# Patient Record
Sex: MALE | Race: WHITE | NOT HISPANIC OR LATINO | Employment: FULL TIME | URBAN - METROPOLITAN AREA
[De-identification: names, ages, dates, MRNs, and addresses within clinical notes are randomized per-mention and may not be internally consistent; named-entity substitution may affect disease eponyms.]

---

## 2020-01-15 ENCOUNTER — OFFICE VISIT (OUTPATIENT)
Dept: FAMILY MEDICINE CLINIC | Facility: CLINIC | Age: 55
End: 2020-01-15
Payer: COMMERCIAL

## 2020-01-15 VITALS
HEART RATE: 94 BPM | HEIGHT: 73 IN | SYSTOLIC BLOOD PRESSURE: 138 MMHG | BODY MASS INDEX: 34.67 KG/M2 | OXYGEN SATURATION: 95 % | RESPIRATION RATE: 16 BRPM | DIASTOLIC BLOOD PRESSURE: 72 MMHG | TEMPERATURE: 97.4 F | WEIGHT: 261.6 LBS

## 2020-01-15 DIAGNOSIS — M77.11 LATERAL EPICONDYLITIS OF RIGHT ELBOW: Primary | ICD-10-CM

## 2020-01-15 PROCEDURE — 99213 OFFICE O/P EST LOW 20 MIN: CPT | Performed by: NURSE PRACTITIONER

## 2020-01-15 RX ORDER — SODIUM FLUORIDE 1.1 G/100G
GEL, DENTIFRICE ORAL
COMMUNITY
Start: 2019-10-12 | End: 2020-02-11 | Stop reason: ALTCHOICE

## 2020-01-15 RX ORDER — MELOXICAM 15 MG/1
15 TABLET ORAL DAILY
Qty: 30 TABLET | Refills: 0 | Status: SHIPPED | OUTPATIENT
Start: 2020-01-15 | End: 2020-02-11 | Stop reason: ALTCHOICE

## 2020-01-15 NOTE — PROGRESS NOTES
Assessment/Plan:    Will treat with Meloxicam daily  Reviewed stretching to be done 2-3 times per day  He will RTO in 1 month for follow up  Recommended he schedule a CPE  States he has DOT physicals and does not wish to schedule at this time  He did bring previous PCP records  1  Lateral epicondylitis of right elbow  -     meloxicam (MOBIC) 15 mg tablet; Take 1 tablet (15 mg total) by mouth daily      BMI Counseling: Body mass index is 34 51 kg/m²  The BMI is above normal  Nutrition recommendations include consuming healthier snacks  Exercise recommendations include moderate physical activity 150 minutes/week  Patient Instructions     Tennis Elbow Exercises   AMBULATORY CARE:   Tennis elbow exercises  help decrease pain in your elbow, forearm, wrist, and hand  They also help strengthen your arm muscles and prevent further injury  Start these exercises slowly  Do the exercises on both arms  Stop if you feel pain  · Finger extensions:  Hold your fingers close together  Put a rubber band around the outside of your fingers and thumb  Spread your fingers apart and then slowly bring them together without letting the rubber band fall off  Repeat 40 times  · Wrist flexor stretch:  Hold your arm straight out in front of you with your palm facing down  Use your other hand to grasp your fingers  Keep your elbow straight and slowly bend your hand back  Your fingertips should point up and your palm should face away from you  Do this until you feel a stretch in the top of your wrist  Hold for 10 seconds  Repeat 5 times  · Wrist extensor stretch: This stretch is the opposite of the wrist flexor stretch  Hold your arm straight out in front of you with your palm facing down  Use your other hand to grasp your fingers  Keep your elbow straight and slowly bend your hand down  Your fingertips should point down and your palm should face you   Do this until you feel a stretch in your wrist  Hold for 10 seconds  Repeat 5 times  · Bicep curls:  Place your hand under your injured elbow for support  Turn your palm so that it faces up and hold a weight in your hand  Ask your healthcare provider how much weight you should use  Slowly bend and straighten your elbow 30 times  · :  Hold a soft rubber ball or tennis ball in your hand  Squeeze the ball as hard as you can and hold this position  Ask your healthcare provider how long to hold this position  Repeat this exercise as directed by your healthcare provider  Contact your healthcare provider if:   · You have increased pain or weakness in your arm, wrist, hand, or fingers  · You have new numbness or tingling in your arm, hand, or fingers  · You have questions or concerns about tennis elbow exercises  © 2017 [address] Information is for End User's use only and may not be sold, redistributed or otherwise used for commercial purposes  All illustrations and images included in CareNotes® are the copyrighted property of A D A M [employer] Inc  or Malcolm Queen  The above information is an  only  It is not intended as medical advice for individual conditions or treatments  Talk to your doctor, nurse or pharmacist before following any medical regimen to see if it is safe and effective for you  Return in about 1 month (around 2/15/2020)  Subjective:      Patient ID: Barbee Cowden is a 53 y o  male  Chief Complaint   Patient presents with    Arm Pain     right arm up to his bicept, feels like he cant control it of lift it  vfrma    Establish Care     np       Here today with complaints of right arm pain for the past 2 weeks  He always sleeps with his arm behind his head and does a lot of repetitive pulling and bending movements  States he woke up with the pain and he developed pain in the lateral aspect of the elbow  Pain described as stabbing    He was unable to actively lift his arm without assistance  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  Was unable to hold a handheld device in his arm yesterday  He took Advil which does help significantly  No obvious swelling or redness  The following portions of the patient's history were reviewed and updated as appropriate: allergies, current medications, past family history, past medical history, past social history, past surgical history and problem list     Review of Systems   Constitutional: Negative  Musculoskeletal:        See HPI         Current Outpatient Medications   Medication Sig Dispense Refill    DENTA 5000 PLUS 1 1 % CREA       meloxicam (MOBIC) 15 mg tablet Take 1 tablet (15 mg total) by mouth daily 30 tablet 0     No current facility-administered medications for this visit  Objective:    /72   Pulse 94   Temp (!) 97 4 °F (36 3 °C)   Resp 16   Ht 6' 1" (1 854 m)   Wt 119 kg (261 lb 9 6 oz)   SpO2 95%   BMI 34 51 kg/m²        Physical Exam   Constitutional: He appears well-developed and well-nourished  Cardiovascular: Normal rate, regular rhythm, normal heart sounds and intact distal pulses  No murmur heard  Pulmonary/Chest: Effort normal and breath sounds normal    Musculoskeletal:        Right elbow: He exhibits normal range of motion and no swelling  Tenderness found  Lateral epicondyle tenderness noted  Neurological: He is alert  Skin: Skin is warm and dry  Psychiatric: He has a normal mood and affect  Nursing note and vitals reviewed               Jonel Gardner

## 2020-01-15 NOTE — PATIENT INSTRUCTIONS
Tennis Elbow Exercises   AMBULATORY CARE:   Tennis elbow exercises  help decrease pain in your elbow, forearm, wrist, and hand  They also help strengthen your arm muscles and prevent further injury  Start these exercises slowly  Do the exercises on both arms  Stop if you feel pain  · Finger extensions:  Hold your fingers close together  Put a rubber band around the outside of your fingers and thumb  Spread your fingers apart and then slowly bring them together without letting the rubber band fall off  Repeat 40 times  · Wrist flexor stretch:  Hold your arm straight out in front of you with your palm facing down  Use your other hand to grasp your fingers  Keep your elbow straight and slowly bend your hand back  Your fingertips should point up and your palm should face away from you  Do this until you feel a stretch in the top of your wrist  Hold for 10 seconds  Repeat 5 times  · Wrist extensor stretch: This stretch is the opposite of the wrist flexor stretch  Hold your arm straight out in front of you with your palm facing down  Use your other hand to grasp your fingers  Keep your elbow straight and slowly bend your hand down  Your fingertips should point down and your palm should face you  Do this until you feel a stretch in your wrist  Hold for 10 seconds  Repeat 5 times  · Bicep curls:  Place your hand under your injured elbow for support  Turn your palm so that it faces up and hold a weight in your hand  Ask your healthcare provider how much weight you should use  Slowly bend and straighten your elbow 30 times  · :  Hold a soft rubber ball or tennis ball in your hand  Squeeze the ball as hard as you can and hold this position  Ask your healthcare provider how long to hold this position  Repeat this exercise as directed by your healthcare provider    Contact your healthcare provider if:   · You have increased pain or weakness in your arm, wrist, hand, or fingers  · You have new numbness or tingling in your arm, hand, or fingers  · You have questions or concerns about tennis elbow exercises  © 2017 [address] Information is for End User's use only and may not be sold, redistributed or otherwise used for commercial purposes  All illustrations and images included in CareNotes® are the copyrighted property of A D A M , Inc  or Malcolm Queen  The above information is an  only  It is not intended as medical advice for individual conditions or treatments  Talk to your doctor, nurse or pharmacist before following any medical regimen to see if it is safe and effective for you

## 2020-02-04 ENCOUNTER — TELEPHONE (OUTPATIENT)
Dept: FAMILY MEDICINE CLINIC | Facility: CLINIC | Age: 55
End: 2020-02-04

## 2020-02-04 DIAGNOSIS — R73.09 ABNORMAL GLUCOSE: Primary | ICD-10-CM

## 2020-02-04 NOTE — TELEPHONE ENCOUNTER
Reviewed pt's previous records from Dr Nate Delgado office and see that he has diabetes  He did not mention this at his visit with me last month  He normally has DOT physicals and did not want to come back here for CPE  Can someone please call him and ask if he is seeing someone for his diabetes, and if not ask him to schedule a f/u for this  I can order labs for him to do prior to visit   Bimal Sanches

## 2020-02-05 NOTE — TELEPHONE ENCOUNTER
Spoke to pt says at one point a UA showed he has a splash of sugar in his urine and Dr Canelo Bethea said he had diabetes, but then in his following DOT it came back normal  But he does not have diabetes      Dinora Martin MA

## 2020-02-05 NOTE — TELEPHONE ENCOUNTER
I found an A1c level of 7 7 in his chart, which would be consistent with uncontrolled diabetes  This is a blood test, not a urine  I strongly suggest he have some more blood work done to follow up on this    Mormandi Speed

## 2020-02-05 NOTE — TELEPHONE ENCOUNTER
Spoke to Madi Pichardo, says he doesn't understand why Dr Soy Dumont would not bring it up to him  He states he doesn't know if he's picking us as his PCP  I explained to him we will order his blood work and leave up in the front for her can pick it up when he's ready       Vinicio Warren, MA

## 2020-02-11 ENCOUNTER — OFFICE VISIT (OUTPATIENT)
Dept: FAMILY MEDICINE CLINIC | Facility: CLINIC | Age: 55
End: 2020-02-11
Payer: COMMERCIAL

## 2020-02-11 VITALS
OXYGEN SATURATION: 95 % | HEART RATE: 86 BPM | RESPIRATION RATE: 16 BRPM | TEMPERATURE: 98.2 F | WEIGHT: 261 LBS | BODY MASS INDEX: 34.59 KG/M2 | DIASTOLIC BLOOD PRESSURE: 80 MMHG | SYSTOLIC BLOOD PRESSURE: 130 MMHG | HEIGHT: 73 IN

## 2020-02-11 DIAGNOSIS — M77.11 LATERAL EPICONDYLITIS OF RIGHT ELBOW: Primary | ICD-10-CM

## 2020-02-11 PROCEDURE — 99213 OFFICE O/P EST LOW 20 MIN: CPT | Performed by: FAMILY MEDICINE

## 2020-02-11 PROCEDURE — 1036F TOBACCO NON-USER: CPT | Performed by: FAMILY MEDICINE

## 2020-02-11 PROCEDURE — 3008F BODY MASS INDEX DOCD: CPT | Performed by: FAMILY MEDICINE

## 2020-02-11 NOTE — PROGRESS NOTES
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 - pt was advised to use an elbow brace for the next few months while at work to protect and support his elbow    There are no Patient Instructions on file for this visit  No follow-ups on file  Subjective:      Patient ID: Osmar Tejada is a 53 y o  male  Chief Complaint   Patient presents with    Follow-up     arm pain-wmcma       Pt unknown to me here to follow up arm pain  Was seen by a different provider a few weeks ago  Pt states when he came in he states he was told he had a pinchjed nerve that was giving him pain states he took a muscle relaxer and now he is pain free and he feels better  Pt states he is using the arm completely normally      The following portions of the patient's history were reviewed and updated as appropriate: allergies, current medications, past family history, past medical history, past social history, past surgical history and problem list     Review of Systems   Constitutional: Negative for activity change, appetite change, chills, diaphoresis, fatigue, fever and unexpected weight change  HENT: Negative for congestion, dental problem, ear pain, mouth sores, sinus pressure, sinus pain, sore throat and trouble swallowing  Eyes: Negative for photophobia, discharge and itching  Respiratory: Negative for apnea, chest tightness and shortness of breath  Cardiovascular: Negative for chest pain, palpitations and leg swelling  Gastrointestinal: Negative for abdominal distention, abdominal pain, blood in stool, nausea and vomiting  Endocrine: Negative for cold intolerance, heat intolerance, polydipsia, polyphagia and polyuria  Genitourinary: Negative for difficulty urinating  Musculoskeletal: Negative for arthralgias  Skin: Negative for color change and wound     Neurological: Negative for dizziness, syncope, speech difficulty and headaches  Hematological: Negative for adenopathy  Psychiatric/Behavioral: Negative for agitation and behavioral problems  No current outpatient medications on file  No current facility-administered medications for this visit  Objective:    /80   Pulse 86   Temp 98 2 °F (36 8 °C)   Resp 16   Ht 6' 1" (1 854 m)   Wt 118 kg (261 lb)   SpO2 95%   BMI 34 43 kg/m²        Physical Exam   Constitutional: He appears well-developed and well-nourished  No distress  HENT:   Head: Normocephalic and atraumatic  Right Ear: External ear normal    Left Ear: External ear normal    Nose: Nose normal    Mouth/Throat: Oropharynx is clear and moist  No oropharyngeal exudate  Eyes: Pupils are equal, round, and reactive to light  EOM are normal  Right eye exhibits no discharge  Left eye exhibits no discharge  No scleral icterus  Neck: No thyromegaly present  Cardiovascular: Normal rate and normal heart sounds  No murmur heard  Pulmonary/Chest: Effort normal and breath sounds normal  No respiratory distress  He has no wheezes  Abdominal: Soft  Bowel sounds are normal  He exhibits no distension and no mass  There is no tenderness  There is no rebound and no guarding  Musculoskeletal: Normal range of motion  No pain with ROM testing  No tender at lat condyle   Neurological: He is alert  He displays normal reflexes  Coordination normal    Skin: Skin is warm and dry  No rash noted  He is not diaphoretic  No erythema  Psychiatric: He has a normal mood and affect  His behavior is normal    Nursing note and vitals reviewed               Bebe Urias DO

## 2021-01-10 ENCOUNTER — OFFICE VISIT (OUTPATIENT)
Dept: URGENT CARE | Facility: CLINIC | Age: 56
End: 2021-01-10
Payer: COMMERCIAL

## 2021-01-10 VITALS
RESPIRATION RATE: 18 BRPM | BODY MASS INDEX: 35.21 KG/M2 | DIASTOLIC BLOOD PRESSURE: 80 MMHG | OXYGEN SATURATION: 95 % | SYSTOLIC BLOOD PRESSURE: 143 MMHG | TEMPERATURE: 97.6 F | WEIGHT: 260 LBS | HEART RATE: 102 BPM | HEIGHT: 72 IN

## 2021-01-10 DIAGNOSIS — L03.012 CELLULITIS OF LEFT THUMB: Primary | ICD-10-CM

## 2021-01-10 PROCEDURE — 99213 OFFICE O/P EST LOW 20 MIN: CPT | Performed by: PHYSICIAN ASSISTANT

## 2021-01-10 RX ORDER — SULFAMETHOXAZOLE AND TRIMETHOPRIM 800; 160 MG/1; MG/1
1 TABLET ORAL EVERY 12 HOURS SCHEDULED
Qty: 14 TABLET | Refills: 0 | Status: SHIPPED | OUTPATIENT
Start: 2021-01-10 | End: 2021-01-17

## 2021-01-10 NOTE — PROGRESS NOTES
[facility]    NAME: Barbara Russell is a 63 y o  male  : 1965    MRN: [id number]  DATE: January 10, 2021  TIME: 3:19 PM    Assessment and Plan   Cellulitis of left thumb [L03 012]  1  Cellulitis of left thumb  sulfamethoxazole-trimethoprim (BACTRIM DS) 800-160 mg per tablet     Patient Instructions   Cellulitis of L thumb  rx bactrim twice daily x 7 days sent via EMR  Keep clean and dry  If redness spreading up thumb and hand to ER for IV antibiotics    Follow up with PCP in 3-5 days  Proceed to  ER if symptoms worsen  Chief Complaint     Chief Complaint   Patient presents with    Finger Injury     Patient complains of Left thumb pain after cutting a allen about a week ago, States it has spots and dry patches on it and is sensitive to the touch  History of Present Illness       Melecio Nuñez is a 58-year-old male who presents to clinic with redness and skin changes on his left thumb  He states that 1 week ago he was cutting a allen when the knife slipped and cut his finger  He said that he applied Neosporin and kept the wound clean and covered  He states that 2 days ago the redness seem to be worsening and he had these white to yellow pustules around the cuticle of the thumb and the pad of his thumb  He denies any fever, chills, discharge from the wound, or streaking up that hand  Review of Systems   Review of Systems   Constitutional: Negative for chills and fever  Skin: Positive for color change and wound       Current Medications       Current Outpatient Medications:     sulfamethoxazole-trimethoprim (BACTRIM DS) 800-160 mg per tablet, Take 1 tablet by mouth every 12 (twelve) hours for 7 days, Disp: 14 tablet, Rfl: 0    Current Allergies     Allergies as of 01/10/2021 - Reviewed 01/10/2021   Allergen Reaction Noted    Indocin [indomethacin] Vomiting 2016            The following portions of the patient's history were reviewed and updated as appropriate: allergies, current medications, past family history, past medical history, past social history, past surgical history and problem list      Past Medical History:   Diagnosis Date    Sleep apnea        Past Surgical History:   Procedure Laterality Date    PALATE / Kathrynn Rosy / EXCISION      TONSILLECTOMY AND ADENOIDECTOMY      VASECTOMY         History reviewed  No pertinent family history  Medications have been verified  Objective   /80 (BP Location: Left arm, Patient Position: Sitting, Cuff Size: Standard)   Pulse 102   Temp 97 6 °F (36 4 °C) (Tympanic)   Resp 18   Ht 6' (1 829 m)   Wt 118 kg (260 lb)   SpO2 95%   BMI 35 26 kg/m²   No LMP for male patient  Physical Exam     Physical Exam  Vitals signs and nursing note reviewed  Constitutional:       General: He is not in acute distress  Appearance: Normal appearance  He is not ill-appearing  Cardiovascular:      Rate and Rhythm: Normal rate and regular rhythm  Heart sounds: Normal heart sounds  Pulmonary:      Effort: Pulmonary effort is normal       Breath sounds: Normal breath sounds  Musculoskeletal:        Hands:    Neurological:      Mental Status: He is alert and oriented to person, place, and time     Psychiatric:         Mood and Affect: Mood normal          Behavior: Behavior normal

## 2021-01-10 NOTE — PATIENT INSTRUCTIONS
Cellulitis of L thumb  rx bactrim twice daily x 7 days sent via EMR  Keep clean and dry  If redness spreading up thumb and hand to ER for IV antibiotics    Follow up with PCP in 3-5 days  Proceed to  ER if symptoms worsen  Cellulitis   WHAT YOU NEED TO KNOW:   Cellulitis is a skin infection caused by bacteria  Cellulitis may go away on its own or you may need treatment  Your healthcare provider may draw a [place] around the outside edges of your cellulitis  If your cellulitis spreads, your healthcare provider will see it outside of the [place]  DISCHARGE INSTRUCTIONS:   Call 911 if:   · You have sudden trouble breathing or chest pain  Return to the emergency department if:   · Your wound gets larger and more painful  · You feel a crackling under your skin when you touch it  · You have purple dots or bumps on your skin, or you see bleeding under your skin  · You have new swelling and pain in your legs  · The red, warm, swollen area gets larger  · You see red streaks coming from the infected area  Contact your healthcare provider if:   · You have a fever  · Your fever or pain does not go away or gets worse  · The area does not get smaller after 2 days of antibiotics  · Your skin is flaking or peeling off  · You have questions or concerns about your condition or care  Medicines:   · Antibiotics  help treat the bacterial infection  · NSAIDs , such as ibuprofen, help decrease swelling, pain, and fever  NSAIDs can cause stomach bleeding or kidney problems in certain people  If you take blood thinner medicine, always ask if NSAIDs are safe for you  Always read the medicine label and follow directions  Do not give these medicines to children under 10months of age without direction from your child's healthcare provider  · Acetaminophen  decreases pain and fever  It is available without a doctor's order  Ask how much to take and how often to take it  Follow directions  Read the labels of all other medicines you are using to see if they also contain acetaminophen, or ask your doctor or pharmacist  Acetaminophen can cause liver damage if not taken correctly  Do not use more than 4 grams (4,000 milligrams) total of acetaminophen in one day  · Take your medicine as directed  Contact your healthcare provider if you think your medicine is not helping or if you have side effects  Tell him or her if you are allergic to any medicine  Keep a list of the medicines, vitamins, and herbs you take  Include the amounts, and when and why you take them  Bring the list or the pill bottles to follow-up visits  Carry your medicine list with you in case of an emergency  Self-care:   · Elevate the area above the level of your heart  as often as you can  This will help decrease swelling and pain  Prop the area on pillows or blankets to keep it elevated comfortably  · Clean the area daily until the wound scabs over  Gently wash the area with soap and water  Pat dry  Use dressings as directed  · Place cool or warm, wet cloths on the area as directed  Use clean cloths and clean water  Leave it on the area until the cloth is room temperature  Pat the area dry with a clean, dry cloth  The cloths may help decrease pain  Prevent cellulitis:   · Do not scratch bug bites or areas of injury  You increase your risk for cellulitis by scratching these areas  · Do not share personal items, such as towels, clothing, and razors  · Clean exercise equipment  with germ-killing  before and after you use it  · Wash your hands often  Use soap and water  Wash your hands after you use the bathroom, change a child's diapers, or sneeze  Wash your hands before you prepare or eat food  Use lotion to prevent dry, cracked skin  · Wear pressure stockings as directed  You may be told to wear the stockings if you have peripheral edema   The stockings improve blood flow and decrease swelling  · Treat athlete's foot  This can help prevent the spread of a bacterial skin infection  Follow up with your healthcare provider within 3 days, or as directed: Your healthcare provider will check if your cellulitis is getting better  You may need different medicine  Write down your questions so you remember to ask them during your visits  © Copyright [address] Information is for End User's use only and may not be sold, redistributed or otherwise used for commercial purposes  All illustrations and images included in CareNotes® are the copyrighted property of [employer]  or [facility] [employer]per [employer]   The above information is an  only  It is not intended as medical advice for individual conditions or treatments  Talk to your doctor, nurse or pharmacist before following any medical regimen to see if it is safe and effective for you

## 2021-04-08 DIAGNOSIS — Z23 ENCOUNTER FOR IMMUNIZATION: ICD-10-CM

## 2021-04-11 ENCOUNTER — APPOINTMENT (EMERGENCY)
Dept: RADIOLOGY | Facility: HOSPITAL | Age: 56
End: 2021-04-11
Payer: COMMERCIAL

## 2021-04-11 ENCOUNTER — ANESTHESIA EVENT (OUTPATIENT)
Dept: PERIOP | Facility: HOSPITAL | Age: 56
End: 2021-04-11
Payer: COMMERCIAL

## 2021-04-11 ENCOUNTER — HOSPITAL ENCOUNTER (OUTPATIENT)
Facility: HOSPITAL | Age: 56
Setting detail: OUTPATIENT SURGERY
Discharge: HOME | End: 2021-04-12
Attending: EMERGENCY MEDICINE | Admitting: SURGERY
Payer: COMMERCIAL

## 2021-04-11 ENCOUNTER — ANESTHESIA (OUTPATIENT)
Dept: PERIOP | Facility: HOSPITAL | Age: 56
End: 2021-04-11
Payer: COMMERCIAL

## 2021-04-11 DIAGNOSIS — K40.90 INGUINAL HERNIA: Primary | ICD-10-CM

## 2021-04-11 PROBLEM — Z87.891 FORMER TOBACCO USE: Status: ACTIVE | Noted: 2021-04-11

## 2021-04-11 PROBLEM — E66.9 OBESITY (BMI 30-39.9): Status: ACTIVE | Noted: 2021-04-11

## 2021-04-11 PROBLEM — K40.30 INCARCERATED LEFT INGUINAL HERNIA: Status: ACTIVE | Noted: 2021-04-11

## 2021-04-11 LAB
ALBUMIN SERPL BCP-MCNC: 4.5 G/DL (ref 3.5–5)
ALP SERPL-CCNC: 115 U/L (ref 46–116)
ALT SERPL W P-5'-P-CCNC: 60 U/L (ref 12–78)
ANION GAP SERPL CALCULATED.3IONS-SCNC: 11 MMOL/L (ref 4–13)
AST SERPL W P-5'-P-CCNC: 18 U/L (ref 5–45)
BACTERIA UR QL AUTO: NORMAL /HPF
BASOPHILS # BLD AUTO: 0.04 THOUSANDS/ΜL (ref 0–0.1)
BASOPHILS NFR BLD AUTO: 1 % (ref 0–1)
BILIRUB SERPL-MCNC: 0.47 MG/DL (ref 0.2–1)
BILIRUB UR QL STRIP: NEGATIVE
BUN SERPL-MCNC: 14 MG/DL (ref 5–25)
CALCIUM SERPL-MCNC: 10 MG/DL (ref 8.3–10.1)
CHLORIDE SERPL-SCNC: 100 MMOL/L (ref 100–108)
CLARITY UR: CLEAR
CO2 SERPL-SCNC: 27 MMOL/L (ref 21–32)
COLOR UR: YELLOW
CREAT SERPL-MCNC: 0.75 MG/DL (ref 0.6–1.3)
EOSINOPHIL # BLD AUTO: 0.12 THOUSAND/ΜL (ref 0–0.61)
EOSINOPHIL NFR BLD AUTO: 2 % (ref 0–6)
ERYTHROCYTE [DISTWIDTH] IN BLOOD BY AUTOMATED COUNT: 11.9 % (ref 11.6–15.1)
FLUAV RNA RESP QL NAA+PROBE: NEGATIVE
FLUBV RNA RESP QL NAA+PROBE: NEGATIVE
GFR SERPL CREATININE-BSD FRML MDRD: 102 ML/MIN/1.73SQ M
GLUCOSE SERPL-MCNC: 260 MG/DL (ref 65–140)
GLUCOSE SERPL-MCNC: 286 MG/DL (ref 65–140)
GLUCOSE SERPL-MCNC: 308 MG/DL (ref 65–140)
GLUCOSE UR STRIP-MCNC: ABNORMAL MG/DL
HCT VFR BLD AUTO: 49.2 % (ref 36.5–49.3)
HGB BLD-MCNC: 16.6 G/DL (ref 12–17)
HGB UR QL STRIP.AUTO: NEGATIVE
IMM GRANULOCYTES # BLD AUTO: 0.03 THOUSAND/UL (ref 0–0.2)
IMM GRANULOCYTES NFR BLD AUTO: 0 % (ref 0–2)
KETONES UR STRIP-MCNC: NEGATIVE MG/DL
LEUKOCYTE ESTERASE UR QL STRIP: ABNORMAL
LYMPHOCYTES # BLD AUTO: 1.85 THOUSANDS/ΜL (ref 0.6–4.47)
LYMPHOCYTES NFR BLD AUTO: 23 % (ref 14–44)
MCH RBC QN AUTO: 30.2 PG (ref 26.8–34.3)
MCHC RBC AUTO-ENTMCNC: 33.7 G/DL (ref 31.4–37.4)
MCV RBC AUTO: 90 FL (ref 82–98)
MONOCYTES # BLD AUTO: 0.51 THOUSAND/ΜL (ref 0.17–1.22)
MONOCYTES NFR BLD AUTO: 6 % (ref 4–12)
NEUTROPHILS # BLD AUTO: 5.51 THOUSANDS/ΜL (ref 1.85–7.62)
NEUTS SEG NFR BLD AUTO: 68 % (ref 43–75)
NITRITE UR QL STRIP: NEGATIVE
NON-SQ EPI CELLS URNS QL MICRO: NORMAL /HPF
NRBC BLD AUTO-RTO: 0 /100 WBCS
PH UR STRIP.AUTO: 5.5 [PH]
PLATELET # BLD AUTO: 210 THOUSANDS/UL (ref 149–390)
PMV BLD AUTO: 10.1 FL (ref 8.9–12.7)
POTASSIUM SERPL-SCNC: 4 MMOL/L (ref 3.5–5.3)
PROT SERPL-MCNC: 7.5 G/DL (ref 6.4–8.2)
PROT UR STRIP-MCNC: ABNORMAL MG/DL
RBC # BLD AUTO: 5.5 MILLION/UL (ref 3.88–5.62)
RBC #/AREA URNS AUTO: NORMAL /HPF
RSV RNA RESP QL NAA+PROBE: NEGATIVE
SARS-COV-2 RNA RESP QL NAA+PROBE: NEGATIVE
SODIUM SERPL-SCNC: 138 MMOL/L (ref 136–145)
SP GR UR STRIP.AUTO: 1.02 (ref 1–1.03)
TROPONIN I SERPL-MCNC: <0.02 NG/ML
UROBILINOGEN UR QL STRIP.AUTO: 0.2 E.U./DL
WBC # BLD AUTO: 8.06 THOUSAND/UL (ref 4.31–10.16)
WBC #/AREA URNS AUTO: NORMAL /HPF

## 2021-04-11 PROCEDURE — C1781 MESH (IMPLANTABLE): HCPCS | Performed by: SURGERY

## 2021-04-11 PROCEDURE — 88302 TISSUE EXAM BY PATHOLOGIST: CPT | Performed by: PATHOLOGY

## 2021-04-11 PROCEDURE — 55520 REMOVAL OF SPERM CORD LESION: CPT | Performed by: SURGERY

## 2021-04-11 PROCEDURE — 93005 ELECTROCARDIOGRAM TRACING: CPT

## 2021-04-11 PROCEDURE — 49507 PRP I/HERN INIT BLOCK >5 YR: CPT | Performed by: SURGERY

## 2021-04-11 PROCEDURE — 99285 EMERGENCY DEPT VISIT HI MDM: CPT | Performed by: PHYSICIAN ASSISTANT

## 2021-04-11 PROCEDURE — 36415 COLL VENOUS BLD VENIPUNCTURE: CPT | Performed by: PHYSICIAN ASSISTANT

## 2021-04-11 PROCEDURE — 96360 HYDRATION IV INFUSION INIT: CPT

## 2021-04-11 PROCEDURE — 99284 EMERGENCY DEPT VISIT MOD MDM: CPT

## 2021-04-11 PROCEDURE — 55520 REMOVAL OF SPERM CORD LESION: CPT | Performed by: PHYSICIAN ASSISTANT

## 2021-04-11 PROCEDURE — G1004 CDSM NDSC: HCPCS

## 2021-04-11 PROCEDURE — 49585 PR REPAIR UMBILICAL HERN,5+Y/O,REDUC: CPT | Performed by: PHYSICIAN ASSISTANT

## 2021-04-11 PROCEDURE — 84484 ASSAY OF TROPONIN QUANT: CPT | Performed by: SURGERY

## 2021-04-11 PROCEDURE — 80053 COMPREHEN METABOLIC PANEL: CPT | Performed by: PHYSICIAN ASSISTANT

## 2021-04-11 PROCEDURE — 49507 PRP I/HERN INIT BLOCK >5 YR: CPT | Performed by: PHYSICIAN ASSISTANT

## 2021-04-11 PROCEDURE — 82948 REAGENT STRIP/BLOOD GLUCOSE: CPT

## 2021-04-11 PROCEDURE — 99220 PR INITIAL OBSERVATION CARE/DAY 70 MINUTES: CPT | Performed by: SURGERY

## 2021-04-11 PROCEDURE — 49585 PR REPAIR UMBILICAL HERN,5+Y/O,REDUC: CPT | Performed by: SURGERY

## 2021-04-11 PROCEDURE — 85025 COMPLETE CBC W/AUTO DIFF WBC: CPT | Performed by: PHYSICIAN ASSISTANT

## 2021-04-11 PROCEDURE — 74177 CT ABD & PELVIS W/CONTRAST: CPT

## 2021-04-11 PROCEDURE — 0241U HB NFCT DS VIR RESP RNA 4 TRGT: CPT | Performed by: SURGERY

## 2021-04-11 PROCEDURE — 81001 URINALYSIS AUTO W/SCOPE: CPT | Performed by: PHYSICIAN ASSISTANT

## 2021-04-11 DEVICE — POLYPROPYLENE NON-ABSORBABLE SYNTHETIC SURGICAL MESH
Type: IMPLANTABLE DEVICE | Status: FUNCTIONAL
Brand: PROLENE

## 2021-04-11 DEVICE — VENTRALEX HERNIA PATCH, 8.0 CM (3.2"), LARGE CIRCLE WITH STRAP
Type: IMPLANTABLE DEVICE | Status: FUNCTIONAL
Brand: VENTRALEX

## 2021-04-11 RX ORDER — SODIUM CHLORIDE, SODIUM LACTATE, POTASSIUM CHLORIDE, CALCIUM CHLORIDE 600; 310; 30; 20 MG/100ML; MG/100ML; MG/100ML; MG/100ML
INJECTION, SOLUTION INTRAVENOUS CONTINUOUS PRN
Status: DISCONTINUED | OUTPATIENT
Start: 2021-04-11 | End: 2021-04-11

## 2021-04-11 RX ORDER — NEOSTIGMINE METHYLSULFATE 1 MG/ML
INJECTION INTRAVENOUS AS NEEDED
Status: DISCONTINUED | OUTPATIENT
Start: 2021-04-11 | End: 2021-04-11

## 2021-04-11 RX ORDER — HEPARIN SODIUM 5000 [USP'U]/ML
5000 INJECTION, SOLUTION INTRAVENOUS; SUBCUTANEOUS EVERY 8 HOURS SCHEDULED
Status: DISCONTINUED | OUTPATIENT
Start: 2021-04-11 | End: 2021-04-12 | Stop reason: HOSPADM

## 2021-04-11 RX ORDER — ROCURONIUM BROMIDE 10 MG/ML
INJECTION, SOLUTION INTRAVENOUS AS NEEDED
Status: DISCONTINUED | OUTPATIENT
Start: 2021-04-11 | End: 2021-04-11

## 2021-04-11 RX ORDER — GLYCOPYRROLATE 0.2 MG/ML
INJECTION INTRAMUSCULAR; INTRAVENOUS AS NEEDED
Status: DISCONTINUED | OUTPATIENT
Start: 2021-04-11 | End: 2021-04-11

## 2021-04-11 RX ORDER — SODIUM CHLORIDE, SODIUM LACTATE, POTASSIUM CHLORIDE, CALCIUM CHLORIDE 600; 310; 30; 20 MG/100ML; MG/100ML; MG/100ML; MG/100ML
50 INJECTION, SOLUTION INTRAVENOUS CONTINUOUS
Status: DISCONTINUED | OUTPATIENT
Start: 2021-04-11 | End: 2021-04-12

## 2021-04-11 RX ORDER — OXYCODONE HYDROCHLORIDE 5 MG/1
10 TABLET ORAL EVERY 4 HOURS PRN
Status: DISCONTINUED | OUTPATIENT
Start: 2021-04-11 | End: 2021-04-12

## 2021-04-11 RX ORDER — DOCUSATE SODIUM 100 MG/1
100 CAPSULE, LIQUID FILLED ORAL 2 TIMES DAILY PRN
Status: DISCONTINUED | OUTPATIENT
Start: 2021-04-11 | End: 2021-04-12 | Stop reason: HOSPADM

## 2021-04-11 RX ORDER — ONDANSETRON 2 MG/ML
INJECTION INTRAMUSCULAR; INTRAVENOUS AS NEEDED
Status: DISCONTINUED | OUTPATIENT
Start: 2021-04-11 | End: 2021-04-11

## 2021-04-11 RX ORDER — BUPIVACAINE HYDROCHLORIDE AND EPINEPHRINE 5; 5 MG/ML; UG/ML
INJECTION, SOLUTION PERINEURAL AS NEEDED
Status: DISCONTINUED | OUTPATIENT
Start: 2021-04-11 | End: 2021-04-11 | Stop reason: HOSPADM

## 2021-04-11 RX ORDER — FENTANYL CITRATE 50 UG/ML
INJECTION, SOLUTION INTRAMUSCULAR; INTRAVENOUS AS NEEDED
Status: DISCONTINUED | OUTPATIENT
Start: 2021-04-11 | End: 2021-04-11

## 2021-04-11 RX ORDER — MIDAZOLAM HYDROCHLORIDE 2 MG/2ML
INJECTION, SOLUTION INTRAMUSCULAR; INTRAVENOUS AS NEEDED
Status: DISCONTINUED | OUTPATIENT
Start: 2021-04-11 | End: 2021-04-11

## 2021-04-11 RX ORDER — MAGNESIUM HYDROXIDE 1200 MG/15ML
LIQUID ORAL AS NEEDED
Status: DISCONTINUED | OUTPATIENT
Start: 2021-04-11 | End: 2021-04-11 | Stop reason: HOSPADM

## 2021-04-11 RX ORDER — HYDROMORPHONE HCL/PF 1 MG/ML
SYRINGE (ML) INJECTION AS NEEDED
Status: DISCONTINUED | OUTPATIENT
Start: 2021-04-11 | End: 2021-04-11

## 2021-04-11 RX ORDER — ACETAMINOPHEN 325 MG/1
650 TABLET ORAL EVERY 6 HOURS PRN
Status: DISCONTINUED | OUTPATIENT
Start: 2021-04-11 | End: 2021-04-12

## 2021-04-11 RX ORDER — SODIUM CHLORIDE 9 MG/ML
50 INJECTION, SOLUTION INTRAVENOUS CONTINUOUS
Status: DISCONTINUED | OUTPATIENT
Start: 2021-04-11 | End: 2021-04-12 | Stop reason: HOSPADM

## 2021-04-11 RX ORDER — DEXAMETHASONE SODIUM PHOSPHATE 10 MG/ML
INJECTION, SOLUTION INTRAMUSCULAR; INTRAVENOUS AS NEEDED
Status: DISCONTINUED | OUTPATIENT
Start: 2021-04-11 | End: 2021-04-11

## 2021-04-11 RX ORDER — LIDOCAINE HYDROCHLORIDE 10 MG/ML
INJECTION, SOLUTION EPIDURAL; INFILTRATION; INTRACAUDAL; PERINEURAL AS NEEDED
Status: DISCONTINUED | OUTPATIENT
Start: 2021-04-11 | End: 2021-04-11

## 2021-04-11 RX ORDER — HYDROMORPHONE HCL/PF 1 MG/ML
0.5 SYRINGE (ML) INJECTION EVERY 4 HOURS PRN
Status: DISCONTINUED | OUTPATIENT
Start: 2021-04-11 | End: 2021-04-12

## 2021-04-11 RX ORDER — HYDROMORPHONE HCL/PF 1 MG/ML
0.4 SYRINGE (ML) INJECTION
Status: DISCONTINUED | OUTPATIENT
Start: 2021-04-11 | End: 2021-04-11 | Stop reason: HOSPADM

## 2021-04-11 RX ORDER — OXYCODONE HYDROCHLORIDE 5 MG/1
5 TABLET ORAL EVERY 4 HOURS PRN
Status: DISCONTINUED | OUTPATIENT
Start: 2021-04-11 | End: 2021-04-12 | Stop reason: HOSPADM

## 2021-04-11 RX ORDER — PROPOFOL 10 MG/ML
INJECTION, EMULSION INTRAVENOUS AS NEEDED
Status: DISCONTINUED | OUTPATIENT
Start: 2021-04-11 | End: 2021-04-11

## 2021-04-11 RX ORDER — ONDANSETRON 2 MG/ML
4 INJECTION INTRAMUSCULAR; INTRAVENOUS EVERY 4 HOURS PRN
Status: DISCONTINUED | OUTPATIENT
Start: 2021-04-11 | End: 2021-04-12 | Stop reason: HOSPADM

## 2021-04-11 RX ORDER — ONDANSETRON 2 MG/ML
4 INJECTION INTRAMUSCULAR; INTRAVENOUS ONCE AS NEEDED
Status: DISCONTINUED | OUTPATIENT
Start: 2021-04-11 | End: 2021-04-11 | Stop reason: HOSPADM

## 2021-04-11 RX ORDER — CALCIUM CARBONATE 200(500)MG
500 TABLET,CHEWABLE ORAL DAILY PRN
Status: DISCONTINUED | OUTPATIENT
Start: 2021-04-11 | End: 2021-04-12 | Stop reason: HOSPADM

## 2021-04-11 RX ORDER — CEFAZOLIN SODIUM 2 G/50ML
2000 SOLUTION INTRAVENOUS ONCE
Status: COMPLETED | OUTPATIENT
Start: 2021-04-11 | End: 2021-04-11

## 2021-04-11 RX ADMIN — MIDAZOLAM 2 MG: 1 INJECTION INTRAMUSCULAR; INTRAVENOUS at 19:03

## 2021-04-11 RX ADMIN — SODIUM CHLORIDE, SODIUM LACTATE, POTASSIUM CHLORIDE, AND CALCIUM CHLORIDE: .6; .31; .03; .02 INJECTION, SOLUTION INTRAVENOUS at 20:53

## 2021-04-11 RX ADMIN — SODIUM CHLORIDE, SODIUM LACTATE, POTASSIUM CHLORIDE, AND CALCIUM CHLORIDE: .6; .31; .03; .02 INJECTION, SOLUTION INTRAVENOUS at 19:05

## 2021-04-11 RX ADMIN — IOHEXOL 99 ML: 350 INJECTION, SOLUTION INTRAVENOUS at 15:18

## 2021-04-11 RX ADMIN — HYDROMORPHONE HYDROCHLORIDE 1 MG: 1 INJECTION, SOLUTION INTRAMUSCULAR; INTRAVENOUS; SUBCUTANEOUS at 19:32

## 2021-04-11 RX ADMIN — HEPARIN SODIUM 5000 UNITS: 5000 INJECTION INTRAVENOUS; SUBCUTANEOUS at 22:38

## 2021-04-11 RX ADMIN — NEOSTIGMINE METHYLSULFATE 5 MG: 1 INJECTION INTRAVENOUS at 21:01

## 2021-04-11 RX ADMIN — FENTANYL CITRATE 50 MCG: 50 INJECTION, SOLUTION INTRAMUSCULAR; INTRAVENOUS at 20:19

## 2021-04-11 RX ADMIN — FENTANYL CITRATE 100 MCG: 50 INJECTION, SOLUTION INTRAMUSCULAR; INTRAVENOUS at 19:10

## 2021-04-11 RX ADMIN — ROCURONIUM BROMIDE 15 MG: 10 INJECTION, SOLUTION INTRAVENOUS at 20:48

## 2021-04-11 RX ADMIN — ONDANSETRON 4 MG: 2 INJECTION INTRAMUSCULAR; INTRAVENOUS at 19:10

## 2021-04-11 RX ADMIN — GLYCOPYRROLATE 1 MG: 0.2 INJECTION, SOLUTION INTRAMUSCULAR; INTRAVENOUS at 21:01

## 2021-04-11 RX ADMIN — DEXAMETHASONE SODIUM PHOSPHATE 10 MG: 10 INJECTION, SOLUTION INTRAMUSCULAR; INTRAVENOUS at 19:10

## 2021-04-11 RX ADMIN — HYDROMORPHONE HYDROCHLORIDE 1 MG: 1 INJECTION, SOLUTION INTRAMUSCULAR; INTRAVENOUS; SUBCUTANEOUS at 20:33

## 2021-04-11 RX ADMIN — LIDOCAINE HYDROCHLORIDE 50 MG: 10 INJECTION, SOLUTION EPIDURAL; INFILTRATION; INTRACAUDAL; PERINEURAL at 19:10

## 2021-04-11 RX ADMIN — SODIUM CHLORIDE, SODIUM LACTATE, POTASSIUM CHLORIDE, AND CALCIUM CHLORIDE 50 ML/HR: .6; .31; .03; .02 INJECTION, SOLUTION INTRAVENOUS at 22:22

## 2021-04-11 RX ADMIN — CEFAZOLIN SODIUM 2000 MG: 2 SOLUTION INTRAVENOUS at 19:13

## 2021-04-11 RX ADMIN — PROPOFOL 250 MG: 10 INJECTION, EMULSION INTRAVENOUS at 19:10

## 2021-04-11 RX ADMIN — FENTANYL CITRATE 50 MCG: 50 INJECTION, SOLUTION INTRAMUSCULAR; INTRAVENOUS at 20:05

## 2021-04-11 RX ADMIN — SODIUM CHLORIDE 1000 ML: 0.9 INJECTION, SOLUTION INTRAVENOUS at 14:04

## 2021-04-11 RX ADMIN — ROCURONIUM BROMIDE 50 MG: 10 INJECTION, SOLUTION INTRAVENOUS at 19:10

## 2021-04-11 RX ADMIN — ROCURONIUM BROMIDE 25 MG: 10 INJECTION, SOLUTION INTRAVENOUS at 20:07

## 2021-04-11 NOTE — ANESTHESIA PREPROCEDURE EVALUATION
Procedure:  REPAIR HERNIA INGUINAL, umbilical hernia repair (Left Groin)    Relevant Problems   Other   (+) Former tobacco use (Quit 3 years ago)   (+) Obesity (BMI 30-39  9)        Physical Exam    Airway    Mallampati score: II  TM Distance: >3 FB  Neck ROM: full     Dental       Cardiovascular      Pulmonary      Other Findings    Last PO intake at 8 am today    Anesthesia Plan  ASA Score- 2 Emergent    Anesthesia Type- general with ASA Monitors  Additional Monitors:   Airway Plan: ETT  Plan Factors-Exercise tolerance (METS): >4 METS  Exercise comment: Able to climb two flights of stairs without cardiopulmonary limitation  Chart reviewed  Existing labs reviewed  Patient summary reviewed  Patient is not a current smoker  Patient did not smoke on day of surgery  Induction- intravenous and rapid sequence induction  Postoperative Plan- Plan for postoperative opioid use  Planned trial extubation    Informed Consent- Anesthetic plan and risks discussed with patient

## 2021-04-11 NOTE — H&P
H&P Exam - General Surgery   Marciano Hubbard 61 y o  male MRN: [id number]  Unit/Bed#: ED 08 Encounter: [id number]    Assessment/Plan     Assessment:  62 yo M with incarcerated LEFT inguinal hernia   Umbilical hernia     Plan:  1  LEFT inguinal hernia   - patient with pain over left inguinal hernia, small knuckle of sigmoid colon difficult to fully reduce  Given pain discussed with patient and wife that operative intervention would be ideal to fully inspect bowel and ensure that portion of sigmoid does not become strangulated   - Discussed with patient risks/benefits and alternatives to hernia repair he is argeeable to proeeding     2  Umbilical hernia   - asymptomatic but has been enlarging  - will repair at same time, advised patient that he may need bowel resection in which case repairs would be without mesh     3  Obesity   - recommend weight loss, low carbohydrate diet     4  Left lung nodule: stable, previously noted    History of Present Illness     HPI:  Marciano Hubbard is a 61 y o  male who presents with pain located in his left scrotum that started this morning  Pain began when he was straining to have a bowel movement, he noticed he had a large bulge in his scrotum that was hard at that time and pain worse  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  He reprots that he knew he had a hernia but was actively smoking aprpoxiately 4 years ago and it wasn't causing much problems so he never got it evaluated or fixed       PMH: reports being healthy, but does not see MD  EKG: with evidence of ?old infarct, discussed with patient   Urine: UA+ glucose, POC glucose 260   PSH: no abdominal surgery, vasectomy       REVIEW OF SYSTEMS  Constitutional:  Denies fever or chills   Eyes:  Denies change in visual acuity   HENT:  Denies nasal congestion or sore throat   Respiratory:  Denies cough or shortness of breath   Cardiovascular:  Denies chest pain or edema history of hypertension  GI: Denies abdominal pain, nausea, vomiting, bloody stools or diarrhea   :  Denies dysuria, frequency, difficulty in micturition and nocturia  Musculoskeletal:  Denies back pain   Neurologic:  Denies headache, focal weakness or sensory changes   Endocrine:  History of diabetes mellitus  Lymphatic:  Denies swollen glands   Psychiatric:  Denies depression or anxiety       Historical Information   Past Medical History:   Diagnosis Date    Sleep apnea      Past Surgical History:   Procedure Laterality Date    PALATE / [place] / EXCISION      TONSILLECTOMY AND ADENOIDECTOMY      VASECTOMY       Social History   Social History     Substance and Sexual Activity   Alcohol Use Yes    Comment: occ     Social History     Substance and Sexual Activity   Drug Use No     Social History     Tobacco Use   Smoking Status Former Smoker    Packs/day: 1 50   Smokeless Tobacco Never Used     E-Cigarette/Vaping    E-Cigarette Use Never User      E-Cigarette/Vaping Substances     Family History: non-contributory    Meds/Allergies   all medications and allergies reviewed  Allergies   Allergen Reactions    Indocin [Indomethacin] Vomiting       Objective   First Vitals:   Blood Pressure: 149/77 (04/11/21 1336)  Pulse: (!) 110 (04/11/21 1336)  Temperature: 97 8 °F (36 6 °C) (04/11/21 1336)  Temp Source: Temporal (04/11/21 1336)  Respirations: 18 (04/11/21 1336)  SpO2: 95 % (04/11/21 1336)    Current Vitals:   Blood Pressure: 144/77 (04/11/21 1600)  Pulse: 86 (04/11/21 1600)  Temperature: 97 8 °F (36 6 °C) (04/11/21 1336)  Temp Source: Temporal (04/11/21 1336)  Respirations: 22 (04/11/21 1600)  SpO2: 98 % (04/11/21 1600)    No intake or output data in the 24 hours ending 04/11/21 1634    Invasive Devices     Peripheral Intravenous Line            Peripheral IV 04/11/21 Left Forearm less than 1 day                Physical Exam:  Vital Signs: /77   Pulse 86   Temp 97 8 °F (36 6 °C) (Temporal)   Resp 22   SpO2 98%   Gen: No acute distress    Eyes: Extraocular motion intact, conjunctiva normal    Neck: Trachea midline, no thyromegaly, No JVD   Pulm: No increased work of breathing    Card:Regular rate    Abd: Soft, non-distended, non-tender, no guarding, no rebound    + umbilical hernia soft no skin changes   LEFT inguinal hernia reducible though difficult to discern if it is fully reduced given abdominal fat   Ext: No cyanosis or edema bilateral    Neuro: Patient oriented to time and place   Psych: Appropriate affect, no obvious psychosis   Skin: No rashes   Rectal: deferred      Lab Results:   I have personally reviewed pertinent lab results  , CBC:   Lab Results   Component Value Date    WBC 8 06 04/11/2021    HGB 16 6 04/11/2021    HCT 49 2 04/11/2021    MCV 90 04/11/2021     04/11/2021    MCH 30 2 04/11/2021    MCHC 33 7 04/11/2021    RDW 11 9 04/11/2021    MPV 10 1 04/11/2021    NRBC 0 04/11/2021   , CMP:   Lab Results   Component Value Date    SODIUM 138 04/11/2021    K 4 0 04/11/2021     04/11/2021    CO2 27 04/11/2021    BUN 14 04/11/2021    CREATININE 0 75 04/11/2021    CALCIUM 10 0 04/11/2021    AST 18 04/11/2021    ALT 60 04/11/2021    ALKPHOS 115 04/11/2021    EGFR 102 04/11/2021     Imaging: I have personally reviewed pertinent reports  EKG, Pathology, and Other Studies: I have personally reviewed pertinent reports  Code Status: No Order  Advance Directive and Living Will:      Power of :    POLST:      Counseling / Coordination of Care  Total floor / unit time spent today 30 minutes  Greater than 50% of total time was spent with the patient and / or family counseling and / or coordination of care  A description of the counseling / coordination of care: 30

## 2021-04-11 NOTE — Clinical Note
Case was discussed with Dr Rigoberto Martínez and the patient's admission status was agreed to be Admission Status: inpatient status to the service of Dr Rigoberto Martínez

## 2021-04-11 NOTE — ED PROVIDER NOTES
History  Chief Complaint   Patient presents with    Groin Swelling     States " I feel like I've grown a third one "  States he sat to have a bowel movement and he noted having something more in his scrotum  States discomfort with walking  States normal stream       The patient is a 77-year-old male presents for evaluation of testicular mass  He reports feeling a mass while urinating this morning  He does have a history of inguinal hernia on the left where the mass was felt  Denies any difficulty with urination  Denies any pain with urination  Denies any hesitancy  Denies any back pain  Denies any fevers chills or redness or drainage to the area  Denies any abdominal pain  He describes feeling, feels like I grew a 3rd nut            None       Past Medical History:   Diagnosis Date    Sleep apnea        Past Surgical History:   Procedure Laterality Date    PALATE / UVULA BIOPSY / EXCISION      TONSILLECTOMY AND ADENOIDECTOMY      VASECTOMY         History reviewed  No pertinent family history  I have reviewed and agree with the history as documented  E-Cigarette/Vaping    E-Cigarette Use Never User      E-Cigarette/Vaping Substances     Social History     Tobacco Use    Smoking status: Former Smoker     Packs/day: 1 50    Smokeless tobacco: Never Used   Substance Use Topics    Alcohol use: Yes     Comment: occ    Drug use: No       Review of Systems   Constitutional: Negative for activity change, appetite change and fatigue  HENT: Negative for nosebleeds, sneezing, sore throat, trouble swallowing and voice change  Eyes: Negative for photophobia, pain and visual disturbance  Respiratory: Negative for apnea, choking and stridor  Cardiovascular: Negative for palpitations and leg swelling  Gastrointestinal: Negative for anal bleeding and constipation  Endocrine: Negative for cold intolerance, heat intolerance, polydipsia and polyphagia     Genitourinary: Negative for decreased urine volume, enuresis, frequency, genital sores and urgency  Musculoskeletal: Negative for joint swelling and myalgias  Allergic/Immunologic: Negative for environmental allergies and food allergies  Neurological: Negative for tremors, seizures, speech difficulty and weakness  Hematological: Negative for adenopathy  Psychiatric/Behavioral: Negative for behavioral problems, decreased concentration, dysphoric mood and hallucinations  All other systems reviewed and are negative  Physical Exam  Physical Exam  Vitals signs reviewed  Constitutional:       General: He is not in acute distress  Appearance: He is well-developed  He is not diaphoretic  HENT:      Head: Normocephalic and atraumatic  Right Ear: External ear normal       Left Ear: External ear normal       Nose: Nose normal    Eyes:      Conjunctiva/sclera: Conjunctivae normal       Pupils: Pupils are equal, round, and reactive to light  Neck:      Musculoskeletal: Normal range of motion and neck supple  Cardiovascular:      Rate and Rhythm: Normal rate and regular rhythm  Heart sounds: Normal heart sounds  No murmur  No friction rub  No gallop  Pulmonary:      Effort: Pulmonary effort is normal  No respiratory distress  Breath sounds: Normal breath sounds  No wheezing  Abdominal:      General: Bowel sounds are normal       Palpations: Abdomen is soft  Hernia: A hernia is present  Hernia is present in the left inguinal area  There is no hernia in the right inguinal area  Genitourinary:     Pubic Area: No rash or pubic lice  Penis: Uncircumcised  Scrotum/Testes: Cremasteric reflex is present  Right: Mass not present  Left: Mass present  Tenderness not present  Epididymis:      Right: Not inflamed  Left: Not inflamed  Musculoskeletal:         General: No swelling or tenderness  Lymphadenopathy:      Lower Body: No right inguinal adenopathy  No left inguinal adenopathy  Skin:     General: Skin is warm and dry  Neurological:      Mental Status: He is alert and oriented to person, place, and time  Psychiatric:         Behavior: Behavior normal          Vital Signs  ED Triage Vitals [04/11/21 1336]   Temperature Pulse Respirations Blood Pressure SpO2   97 8 °F (36 6 °C) (!) 110 18 149/77 95 %      Temp Source Heart Rate Source Patient Position - Orthostatic VS BP Location FiO2 (%)   Temporal Monitor Sitting Right arm --      Pain Score       4           Vitals:    04/11/21 1336 04/11/21 1345   BP: 149/77 149/77   Pulse: (!) 110    Patient Position - Orthostatic VS: Sitting          Visual Acuity      ED Medications  Medications   sodium chloride 0 9 % bolus 1,000 mL (has no administration in time range)       Diagnostic Studies  Results Reviewed     Procedure Component Value Units Date/Time    CBC and differential [[id number]]     Lab Status: No result Specimen: Blood     Comprehensive metabolic panel [[id number]]     Lab Status: No result Specimen: Blood     UA w Reflex to Microscopic w Reflex to Culture [[id number]]     Lab Status: No result Specimen: Urine                  CT abdomen pelvis with contrast    (Results Pending)              Procedures  Procedures         ED Course                                           MDM  Number of Diagnoses or Management Options  Inguinal hernia:   Diagnosis management comments: Spoke with Dr Denise Dwyer (General Surg      Disposition  Final diagnoses:   None     ED Disposition     None      Follow-up Information    None         Patient's Medications    No medications on file     No discharge procedures on file      PDMP Review     None          ED Provider  Electronically Signed by           Nas Garibay PA-C  04/11/21 0990

## 2021-04-12 VITALS
BODY MASS INDEX: 35.16 KG/M2 | TEMPERATURE: 97.8 F | HEART RATE: 104 BPM | RESPIRATION RATE: 19 BRPM | WEIGHT: 259.26 LBS | DIASTOLIC BLOOD PRESSURE: 88 MMHG | SYSTOLIC BLOOD PRESSURE: 149 MMHG | OXYGEN SATURATION: 95 %

## 2021-04-12 LAB
ANION GAP SERPL CALCULATED.3IONS-SCNC: 9 MMOL/L (ref 4–13)
ATRIAL RATE: 80 BPM
BASOPHILS # BLD AUTO: 0.02 THOUSANDS/ΜL (ref 0–0.1)
BASOPHILS NFR BLD AUTO: 0 % (ref 0–1)
BUN SERPL-MCNC: 11 MG/DL (ref 5–25)
CALCIUM SERPL-MCNC: 8.5 MG/DL (ref 8.3–10.1)
CHLORIDE SERPL-SCNC: 100 MMOL/L (ref 100–108)
CO2 SERPL-SCNC: 25 MMOL/L (ref 21–32)
CREAT SERPL-MCNC: 0.6 MG/DL (ref 0.6–1.3)
EOSINOPHIL # BLD AUTO: 0 THOUSAND/ΜL (ref 0–0.61)
EOSINOPHIL NFR BLD AUTO: 0 % (ref 0–6)
ERYTHROCYTE [DISTWIDTH] IN BLOOD BY AUTOMATED COUNT: 11.9 % (ref 11.6–15.1)
EST. AVERAGE GLUCOSE BLD GHB EST-MCNC: 263 MG/DL
GFR SERPL CREATININE-BSD FRML MDRD: 112 ML/MIN/1.73SQ M
GLUCOSE SERPL-MCNC: 261 MG/DL (ref 65–140)
HBA1C MFR BLD: 10.8 %
HCT VFR BLD AUTO: 43.9 % (ref 36.5–49.3)
HGB BLD-MCNC: 14.8 G/DL (ref 12–17)
IMM GRANULOCYTES # BLD AUTO: 0.03 THOUSAND/UL (ref 0–0.2)
IMM GRANULOCYTES NFR BLD AUTO: 0 % (ref 0–2)
LYMPHOCYTES # BLD AUTO: 1.08 THOUSANDS/ΜL (ref 0.6–4.47)
LYMPHOCYTES NFR BLD AUTO: 12 % (ref 14–44)
MAGNESIUM SERPL-MCNC: 1.9 MG/DL (ref 1.6–2.6)
MCH RBC QN AUTO: 30.8 PG (ref 26.8–34.3)
MCHC RBC AUTO-ENTMCNC: 33.7 G/DL (ref 31.4–37.4)
MCV RBC AUTO: 91 FL (ref 82–98)
MONOCYTES # BLD AUTO: 0.57 THOUSAND/ΜL (ref 0.17–1.22)
MONOCYTES NFR BLD AUTO: 6 % (ref 4–12)
NEUTROPHILS # BLD AUTO: 7.58 THOUSANDS/ΜL (ref 1.85–7.62)
NEUTS SEG NFR BLD AUTO: 82 % (ref 43–75)
NRBC BLD AUTO-RTO: 0 /100 WBCS
P AXIS: 44 DEGREES
PLATELET # BLD AUTO: 187 THOUSANDS/UL (ref 149–390)
PMV BLD AUTO: 10.1 FL (ref 8.9–12.7)
POTASSIUM SERPL-SCNC: 4 MMOL/L (ref 3.5–5.3)
PR INTERVAL: 160 MS
QRS AXIS: -44 DEGREES
QRSD INTERVAL: 116 MS
QT INTERVAL: 380 MS
QTC INTERVAL: 438 MS
RBC # BLD AUTO: 4.81 MILLION/UL (ref 3.88–5.62)
SODIUM SERPL-SCNC: 134 MMOL/L (ref 136–145)
T WAVE AXIS: 25 DEGREES
VENTRICULAR RATE: 80 BPM
WBC # BLD AUTO: 9.28 THOUSAND/UL (ref 4.31–10.16)

## 2021-04-12 PROCEDURE — 85025 COMPLETE CBC W/AUTO DIFF WBC: CPT | Performed by: SURGERY

## 2021-04-12 PROCEDURE — 83036 HEMOGLOBIN GLYCOSYLATED A1C: CPT | Performed by: SURGERY

## 2021-04-12 PROCEDURE — 80048 BASIC METABOLIC PNL TOTAL CA: CPT | Performed by: SURGERY

## 2021-04-12 PROCEDURE — 83735 ASSAY OF MAGNESIUM: CPT | Performed by: SURGERY

## 2021-04-12 PROCEDURE — 99024 POSTOP FOLLOW-UP VISIT: CPT | Performed by: SURGERY

## 2021-04-12 PROCEDURE — 93010 ELECTROCARDIOGRAM REPORT: CPT | Performed by: INTERNAL MEDICINE

## 2021-04-12 PROCEDURE — 97161 PT EVAL LOW COMPLEX 20 MIN: CPT

## 2021-04-12 PROCEDURE — NC001 PR NO CHARGE: Performed by: SURGERY

## 2021-04-12 RX ORDER — OXYCODONE HYDROCHLORIDE 5 MG/1
5 TABLET ORAL EVERY 4 HOURS PRN
Qty: 10 TABLET | Refills: 0 | Status: SHIPPED | OUTPATIENT
Start: 2021-04-12 | End: 2022-03-28

## 2021-04-12 RX ORDER — IBUPROFEN 600 MG/1
600 TABLET ORAL EVERY 6 HOURS PRN
Status: DISCONTINUED | OUTPATIENT
Start: 2021-04-12 | End: 2021-04-12 | Stop reason: HOSPADM

## 2021-04-12 RX ORDER — ACETAMINOPHEN 325 MG/1
975 TABLET ORAL EVERY 6 HOURS PRN
Status: DISCONTINUED | OUTPATIENT
Start: 2021-04-12 | End: 2021-04-12 | Stop reason: HOSPADM

## 2021-04-12 RX ADMIN — OXYCODONE HYDROCHLORIDE 10 MG: 5 TABLET ORAL at 02:24

## 2021-04-12 RX ADMIN — ACETAMINOPHEN 975 MG: 325 TABLET, FILM COATED ORAL at 09:35

## 2021-04-12 RX ADMIN — SODIUM CHLORIDE 50 ML/HR: 0.9 INJECTION, SOLUTION INTRAVENOUS at 09:36

## 2021-04-12 RX ADMIN — OXYCODONE 5 MG: 5 TABLET ORAL at 11:23

## 2021-04-12 NOTE — DISCHARGE INSTRUCTIONS
Umbilical and Left Inguinal Hernia Repair  Postoperative Care Instructions      Please call [phone] to schedule follow up appointment with Dr Brent Sanz   Please take colace and senna for stool softeners while on oxycodone     1  General: You may feel pulling sensations around the wound or funny aches and pains around the incisions  This is normal  Even minor surgery is a change in your body and this is your body's reaction to it  If you have had abdominal surgery, it may help to support the incision with a small pillow or blanket for comfort when moving or coughing  2  Wound care: The glue over the incisions will fall off over the next week or two  If you have staples or stitches, they will be removed by the physician at your follow up appointment  3  Showering: You may shower  Please do not soak wound in standing water such as a bath, hot tub, pool, lake, etc  Do not scrub or use exfoliants on the surgical wounds  4  Activity: You may go up and down stairs, walk as much as you are comfortable, but walk at least 3 times each day  If you have had abdominal surgery, do not perform any strenuous exercise or lift anything heavier than 15 pounds for at least 4 weeks, unless cleared by your physician  5  Diet: You may resume your regular diet  Please drink lots of water  6  Medications: Resume all of your previous medications, unless told otherwise by the doctor  A good option for pain control is to start with acetaminophen(Tylenol) 650mg and ibuprofen(Advil) 600mg and alternate taking them every 3 hours  If this is not sufficient then you make take the narcotic pain medicine as prescribed  You do not need to take the narcotic pain medication unless you are having significant pain and discomfort  Please take the narcotic medication with food  Insure that you do not take more than 4000 mg of Tylenol per day  7  Driving: You will need someone to drive you home on the day of surgery   Do not drive or make any important decisions while on narcotic pain medication  Generally, you may drive 48 hours after you've stopped taking all narcotic pain medications  8  Upset Stomach: You may take Maalox, Tums, or similar items for an upset stomach  If your narcotic pain medication causes an upset stomach, do not take it on an empty stomach  Try taking it with at least some crackers or toast      9  Constipation: Patients often experience constipation after surgery  We recommend starting an over-the-counter medication for this, such as Metamucil, Senokot, Colace, milk of magnesia, etc  You may stop taking these medications a couple days after your last dose of narcotic medication  If you experience significant nausea or vomiting after abdominal surgery, call the office before trying any of these medications  10  Call the office: If you are experiencing any of the following: fevers above 101 5°, significant nausea or vomiting, if the wound develops drainage and/or excessive redness around the wound, or if you have significant diarrhea or other worsening symptoms      11  Pain: A prescription for narcotic pain medication will be sent to your pharmacy upon discharge from the hospital

## 2021-04-12 NOTE — UTILIZATION REVIEW
Initial Clinical Review    Admission: Date/Time/Statement:   Admission Orders (From admission, onward)     Ordered        04/11/21 1551  Place in Observation  Once                   Orders Placed This Encounter   Procedures    Place in Observation     Standing Status:   Standing     Number of Occurrences:   1     Order Specific Question:   Level of Care     Answer:   Med Surg [16]     ED Arrival Information     Expected Arrival Acuity Means of Arrival Escorted By Service Admission Type    - 4/11/2021 13:29 Emergent [facility]In [facility] Surgery-General Emergency    Arrival Complaint    genital problems        Chief Complaint   Patient presents with    Groin Swelling     States " I feel like I've grown a third one "  States he sat to have a bowel movement and he noted having something more in his scrotum  States discomfort with walking  States normal stream       Assessment/Plan:   Assessment:  60 yo M with incarcerated LEFT inguinal hernia   Umbilical hernia   Plan:  1  LEFT inguinal hernia   - patient with pain over left inguinal hernia, small knuckle of sigmoid colon difficult to fully reduce  Given pain discussed with patient and wife that operative intervention would be ideal to fully inspect bowel and ensure that portion of sigmoid does not become strangulated   - Discussed with patient risks/benefits and alternatives to hernia repair he is argeeable to proeeding   2  Umbilical hernia   - asymptomatic but has been enlarging  - will repair at same time, advised patient that he may need bowel resection in which case repairs would be without mesh   3  Obesity   - recommend weight loss, low carbohydrate diet   4  Left lung nodule: stable, previously noted  OP NOTE  Procedure(s) (LRB):  REPAIR HERNIA INGUINAL, umbilical hernia repair (Left)  Operative Findings:  Inguinal hernia (indirect) with omental containing hernia, sac was strictured with fat that was able to be reduced with force   Sac opened and contents inspected and viable  Sac ligated   high      Large Cord lipoma      Umbilical hernia and epigastric hernia repaired with figure of eight 0 prolene sutures and 8cm ventralex mesh  ED Triage Vitals [04/11/21 1336]   Temperature Pulse Respirations Blood Pressure SpO2   97 8 °F (36 6 °C) (!) 110 18 149/77 95 %      Temp Source Heart Rate Source Patient Position - Orthostatic VS BP Location FiO2 (%)   Temporal Monitor Sitting Right arm --      Pain Score       4          Wt Readings from Last 1 Encounters:   04/12/21 118 kg (259 lb 4 2 oz)     Additional Vital Signs:   04/12/21 00:06:47  97 8 °F (36 6 °C)  104  --  133/88  103  95 %  --  --  --  --  --  --   04/12/21 00:04:33  --  104  19  133/88  103  94 %  --  --  --  --  --  --   04/11/21 22:42:32  98 1 °F (36 7 °C)  93  18  148/93  111  94 %  --  --  --  --  --  --   04/11/21 2215  --  96  18  159/75  --  94 %  --  --  --  --  --  --   04/11/21 2200  --  100  18  157/71  --  93 %  32  --  3 L/min  Nasal cannula  --  --   04/11/21 2148  97 7 °F (36 5 °C)  97  18  168/80  --  93 %  --  6 L/min  --  Simple mask  WDL       Pertinent Labs/Diagnostic Test Results:   4/11 CT ABDOMEN PELVIS Stable left lung nodule  Stable splenomegaly  Periumbilical hernia of fat is larger than the prior examination although with less infiltration of the fat   Nevertheless, some fat infiltration is seen which could be related to incarceration with lymphatic obstruction or inflammation   A 2nd hernia is    also seen just superior  Left inguinal hernia fat although sigmoid colon is slightly protruding into the internal inguinal ring   The mesenteric fat does appear to protrude into the left scrotal region     Results from last 7 days   Lab Units 04/11/21  1548   SARS-COV-2  Negative     Results from last 7 days   Lab Units 04/11/21  1405   WBC Thousand/uL 8 06   HEMOGLOBIN g/dL 16 6   HEMATOCRIT % 49 2   PLATELETS Thousands/uL 210   NEUTROS ABS Thousands/µL 5 51     Results from last 7 days   Lab Units 04/11/21  1405   SODIUM mmol/L 138   POTASSIUM mmol/L 4 0   CHLORIDE mmol/L 100   CO2 mmol/L 27   ANION GAP mmol/L 11   BUN mg/dL 14   CREATININE mg/dL 0 75   EGFR ml/min/1 73sq m 102   CALCIUM mg/dL 10 0     Results from last 7 days   Lab Units 04/11/21  1405   AST U/L 18   ALT U/L 60   ALK PHOS U/L 115   TOTAL PROTEIN g/dL 7 5   ALBUMIN g/dL 4 5   TOTAL BILIRUBIN mg/dL 0 47     Results from last 7 days   Lab Units 04/11/21  2205 04/11/21  1612   POC GLUCOSE mg/dl 286* 260*     Results from last 7 days   Lab Units 04/11/21  1405   GLUCOSE RANDOM mg/dL 308*     Results from last 7 days   Lab Units 04/11/21  1641   TROPONIN I ng/mL <0 02     Results from last 7 days   Lab Units 04/11/21  1524   CLARITY UA  Clear   COLOR UA  Yellow   SPEC GRAV UA  1 020   PH UA  5 5   GLUCOSE UA mg/dl >=1000 (1%)*   KETONES UA mg/dl Negative   BLOOD UA  Negative   PROTEIN UA mg/dl 30 (1+)*   NITRITE UA  Negative   BILIRUBIN UA  Negative   UROBILINOGEN UA E U /dl 0 2   LEUKOCYTES UA  Elevated glucose may cause decreased leukocyte values   See urine microscopic for [facility] result/*   WBC UA /hpf 1-2   RBC UA /hpf None Seen   BACTERIA UA /hpf None Seen   EPITHELIAL CELLS WET PREP /hpf None Seen     Results from last 7 days   Lab Units 04/11/21  1548   INFLUENZA A PCR  Negative   INFLUENZA B PCR  Negative   RSV PCR  Negative       ED Treatment:   Medication Administration from 04/11/2021 1329 to 04/11/2021 1822       Date/Time Order Dose Route Action Action by Comments     04/11/2021 1504 sodium chloride 0 9 % bolus 1,000 mL 0 mL Intravenous Stopped Makayla Richardson RN      04/11/2021 1404 sodium chloride 0 9 % bolus 1,000 mL 1,000 mL Intravenous Dereck 37 Makayla Richardson RN      04/11/2021 1518 iohexol (OMNIPAQUE) 350 MG/ML injection (SINGLE-DOSE) 99 mL 99 mL Intravenous Given Nas Hampton         Past Medical History:   Diagnosis Date    Sleep apnea      Present on Admission:  **None**      Admitting Diagnosis: Inguinal hernia [K40 90]  Groin swelling [R19 09]  Age/Sex: 61 y o  male  Admission Orders:  BMP MG  CBC DIFF   HGB A1C  TISSUE EXAM  PLT  Abdominal binder   I/o daily weights  Scheduled Medications:  heparin (porcine), 5,000 Units, Subcutaneous, Q8H [facility]      Continuous IV Infusions:  sodium chloride, 50 mL/hr, Intravenous, Continuous      PRN Meds:  acetaminophen, 975 mg, Oral, Q6H PRN  calcium carbonate, 500 mg, Oral, Daily PRN  docusate sodium, 100 mg, Oral, BID PRN  HYDROmorphone, 0 5 mg, Intravenous, Q4H PRN  ondansetron, 4 mg, Intravenous, Q4H PRN  oxyCODONE, 10 mg, Oral, Q4H PRN  oxyCODONE, 5 mg, Oral, Q4H PRN        IP CONSULT TO CASE MANAGEMENT    Network Utilization Review Department  ATTENTION: Please call with any questions or concerns to [phone] and carefully listen to the prompts so that you are directed to the right person  All voicemails are confidential   Esperanza Wynn all requests for admission clinical reviews, approved or denied determinations and any other requests to dedicated fax number below belonging to the campus where the patient is receiving treatment   List of dedicated fax numbers for the Facilities:  [address] DENIALS (Administrative/Medical Necessity) [phone]   [address] (Maternity/NICU/Pediatrics) [phone]   [address] [place] [address] Dr Maty Woodson [phone] ([facility]) [address] [facility] 28 [address]ra Olivas 1481 [address] [place]) [address] [place] [address] [phone]

## 2021-04-12 NOTE — PLAN OF CARE
Problem: Potential for Falls  Goal: Patient will remain free of falls  Description: INTERVENTIONS:  - Assess patient frequently for physical needs  -  Identify cognitive and physical deficits and behaviors that affect risk of falls    -  [place] fall precautions as indicated by assessment   - Educate patient/family on patient safety including physical limitations  - Instruct patient to call for assistance with activity based on assessment  - Modify environment to reduce risk of injury  - Consider OT/PT consult to assist with strengthening/mobility  Outcome: Progressing     Problem: GASTROINTESTINAL - ADULT  Goal: Minimal or absence of nausea and/or vomiting  Description: INTERVENTIONS:  - Administer IV fluids if ordered to ensure adequate hydration  - Maintain NPO status until nausea and vomiting are resolved  - Nasogastric tube if ordered  - Administer ordered antiemetic medications as needed  - Provide nonpharmacologic comfort measures as appropriate  - Advance diet as tolerated, if ordered  - Consider nutrition services referral to assist patient with adequate nutrition and appropriate food choices  Outcome: Progressing  Goal: Maintains or returns to baseline bowel function  Description: INTERVENTIONS:  - Assess bowel function  - Encourage oral fluids to ensure adequate hydration  - Administer IV fluids if ordered to ensure adequate hydration  - Administer ordered medications as needed  - Encourage mobilization and activity  - Consider nutritional services referral to assist patient with adequate nutrition and appropriate food choices  Outcome: Progressing  Goal: Maintains adequate nutritional intake  Description: INTERVENTIONS:  - Monitor percentage of each meal consumed  - Identify factors contributing to decreased intake, treat as appropriate  - Assist with meals as needed  - Monitor I&O, weight, and lab values if indicated  - Obtain nutrition services referral as needed  Outcome: Progressing

## 2021-04-12 NOTE — DISCHARGE SUMMARY
Discharge Summary - Ashtyn Carolina 61 y o  male MRN: [id number]    Unit/Bed#: [address] 422[address]01 Encounter: [id number]    Admission Date:   Admission Orders (From admission, onward)     Ordered        04/11/21 1551  Place in Observation  Once                     Admitting Diagnosis: Inguinal hernia [K40 90]  Groin swelling [R19 09]    Per Dr Rogerio Friedman 4/12/21  HPI: Ashtyn Carolina is a 61 y o  male who presents with pain located in his left scrotum that started this morning  Pain began when he was straining to have a bowel movement, he noticed he had a large bulge in his scrotum that was hard at that time and pain worse  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  He reprots that he knew he had a hernia but was actively smoking aprpoxiately 4 years ago and it wasn't causing much problems so he never got it evaluated or fixed       PMH: reports being healthy, but does not see MD  EKG: with evidence of ?old infarct, discussed with patient   Urine: UA+ glucose, POC glucose 260   PSH: no abdominal surgery, vasectomy     Procedures Performed: Umbilical Hernia Repair and Left inguinal Hernia Repair    Summary of Hospital Course:  Patient was seen and evaluated in the emergency department due to abdominal pain and corresponding CT scan showing positive signs of incarcerated left inguinal hernia and umbilical hernia  Patient was planned for emergency surgery as result  Patient had same-day umbilical hernia and left inguinal hernia repair  Patient did not have any acute or immediate complications  Hospital day 1 patient doing quite well and tolerating clear liquids so he was advanced  Patient's labs within normal limits with exception of fasting glucose  Patient was noted on exam to have elevated glucose in the urine as well  Patient had pain well controlled and was ambulating and mobilizing routinely  Patient did have a drawn hemoglobins the ordered make a diagnosis of diabetes    The patient stated before was advanced to regular diet after doing so well and clears  Patient after tolerating regular diet having hemoglobin A1c given recommendations follow with PCP as well as our surgical office in 2 weeks was discharged today 4/12/21  Patient was given instructions on how to care for wound and things to avoid over the course the next 2 weeks  Significant Findings, Care, Treatment and Services Provided: umbilical hernia, incarcerated Left inguinal hernia, Surgical repair of both with mesh    Complications: none     Discharge Diagnosis: s/p left inguinal and umbilical hernia repairs with mesh    Resolved Problems  Date Reviewed: 4/11/2021    None          Condition at Discharge: good         Discharge instructions/Information to patient and family:   See after visit summary for information provided to patient and family  Provisions for Follow-Up Care:  See after visit summary for information related to follow-up care and any pertinent home health orders  PCP: No primary care provider on file  Disposition: Home    Planned Readmission: No      Discharge Statement   I spent 15 minutes discharging the patient  This time was spent on the day of discharge  I had direct contact with the patient on the day of discharge  Additional documentation is required if more than 30 minutes were spent on discharge  Discharge Medications:  See after visit summary for reconciled discharge medications provided to patient and family

## 2021-04-12 NOTE — PLAN OF CARE
Problem: Potential for Falls  Goal: Patient will remain free of falls  Description: INTERVENTIONS:  - Assess patient frequently for physical needs  -  Identify cognitive and physical deficits and behaviors that affect risk of falls    -  [place] fall precautions as indicated by assessment   - Educate patient/family on patient safety including physical limitations  - Instruct patient to call for assistance with activity based on assessment  - Modify environment to reduce risk of injury  - Consider OT/PT consult to assist with strengthening/mobility  Outcome: Progressing     Problem: GASTROINTESTINAL - ADULT  Goal: Minimal or absence of nausea and/or vomiting  Description: INTERVENTIONS:  - Administer IV fluids if ordered to ensure adequate hydration  - Maintain NPO status until nausea and vomiting are resolved  - Nasogastric tube if ordered  - Administer ordered antiemetic medications as needed  - Provide nonpharmacologic comfort measures as appropriate  - Advance diet as tolerated, if ordered  - Consider nutrition services referral to assist patient with adequate nutrition and appropriate food choices  Outcome: Progressing  Goal: Maintains or returns to baseline bowel function  Description: INTERVENTIONS:  - Assess bowel function  - Encourage oral fluids to ensure adequate hydration  - Administer IV fluids if ordered to ensure adequate hydration  - Administer ordered medications as needed  - Encourage mobilization and activity  - Consider nutritional services referral to assist patient with adequate nutrition and appropriate food choices  Outcome: Progressing  Goal: Maintains adequate nutritional intake  Description: INTERVENTIONS:  - Monitor percentage of each meal consumed  - Identify factors contributing to decreased intake, treat as appropriate  - Assist with meals as needed  - Monitor I&O, weight, and lab values if indicated  - Obtain nutrition services referral as needed  Outcome: Progressing

## 2021-04-12 NOTE — OP NOTE
OPERATIVE REPORT  PATIENT NAME: Sybil Christine    :  1965  MRN: [id number]  Pt Location: [facility] OR ROOM 01    SURGERY DATE: 2021    Surgeon(s) and Role:     Anton Miller MD - Primary     * Allan Nolen PA-C - Assisting    Preop Diagnosis:  Inguinal hernia [K40 90]    Post-Op Diagnosis Codes:     * Inguinal hernia [K40 90]    Procedure(s) (LRB):  REPAIR HERNIA INGUINAL, umbilical hernia repair (Left)    Specimen(s):  ID Type Source Tests Collected by Time Destination   1 : lipoma of cord and hernia sac Tissue Lipoma of cord TISSUE EXAM Aarti Alejo MD 2021    2 : umbilical hernia sac Tissue Hernia Sac, Umbilical TISSUE EXAM Aarti Alejo MD 2021        Estimated Blood Loss:   Minimal    Drains:  * No LDAs found *    Anesthesia Type:   General    Operative Indications:  Inguinal hernia [K40 90], concern for incarcerated portion of sigmoid colon,   Umbilical hernia     Operative Findings:  Inguinal hernia (indirect) with omental containing hernia, sac was strictured with fat that was able to be reduced with force  Sac opened and contents inspected and viable  Sac ligated   high      Large Cord lipoma     Umbilical hernia and epigastric hernia repaired with figure of eight 0 prolene sutures and 8cm ventralex mesh     Complications:   None    Procedure and Technique:  After informed consent, risks and benefits of the procedure were explained to the patient, the patient was brought to the operating room, prepped and draped in the normal sterile fashion  The left inguinal ligament was identified from the pubic tubercle to the ASIS  Two fingerbreadths above the pubic tubercle, a transverse incision was made  First, the skin was anesthetized with bupivacaine and then an incision was made with a #15 blade scalpel, approximately 5 cm in length  Dissection was then carried down with electro Bovie cautery through Morenos fascia maintaining hemostasis   Once the external oblique was identified, external oblique was incised in the length of its fibers with a Bovie cautery  Metzenbaum scissors were then used to extend the incision in both directions opening up the external oblique down to the external ring  Next, the external oblique was grasped with hemostats on both sides  The cord, cord structures as well as hernia sac were freed up circumferentially and a Penrose drain was placed around it  A large cord lipoma was identified and removed  Next, the hernia sac was identified and the anteromedial portion of the hernia sac was stripped down, grasped with two hemostats  The hernia sac was then opened and explored and a portion of omentum noted to be contained and partially incarcerated at an area of narrowing  This was fully reduced  Abdominal contents viable  Sac ligated high  Next, attention was made to placing a Prolene mesh to cover the floor  The mesh was sutured with 2-0 prolene to the pubic tubercle medially along the ilioinguinal ligament inferiorly and along the conjoint tendon superiorly making a slit for the cord and cord structures  The external oblique was then closed over the roof with a running #2-0 vicryl suture, taking care not to strangulate the cord and to recreate the external ring  After infusing bupivacaine into the external ring and around the cord structures for anesthetic, the Morenos fascia was approximated with interrupted #2-0 Vicryl sutures  The skin was closed with a running subcuticular #4-0 monocryl suture and then histoacryl was placed on the skin  Next attention was turned towards repairing the umbilical hernia  an incision was made with the skin and carried down to the level of the facia around the hernia  The hernia was dissected cicumferentially and the fascia was opened  A second hernia was noted superiorly to the umbilical hernia  Hernia sac was resected and all hernia contents were introduced into the abdomen   Next, an 8cm ventralex mesh was placed and secured in to the fascia that was re-approximated with 0 prolene figure of eight sutures  The patient tolerated the procedure well and was transferred to Recovery in stable condition  ISSA Salinas, was present and scrubbed for the entire case to assist with retraction   A qualified resident was not present     I was scrubbed for the entire case     Patient Disposition:  PACU     SIGNATURE: Magdalene Romberg, MD  DATE: April 11, 2021  TIME: 9:12 PM

## 2021-04-12 NOTE — PROGRESS NOTES
Progress Note - General Surgery   Orlena Duane 61 y o  male MRN: [id number]  Unit/Bed#: [address] [place] [address] Encounter: [id number]    Assessment:  1) POD #1 s/p umbilical hernia and Left inguinal hernia repair - doing very well, AVSS, labs within normal limits, tolerating clear liquids, minimal to no abdominal pain other than traditional postoperative tenderness, dressing in place, incisions are clean dry, intact  2) Elevated Glucose - early/boderline diabetes suspected, Hemoglobin A1C pending     Plan:  1-2)   - wait for pending hemoglobin A1c, possibility of starting on the 1 versus follow-up with PCP depending on hemoglobin A1c  - discharged today 4/12/21  - significant patient Education burning cessation of sugar based beverages and avoiding, carbohydrates as well as instituting an exercise regimen  - indication for postoperative visit and wound care instructions as well as dos/don't  - Colace and Senokot  - cessation of high dose opioids and starting ibuprofen and tylenol with 5 mg PO oxy for PRN analgesia  - advance to regular diet  - discontinue IV fluids  - incentive spirometry  - I/O   - DVT prophylaxis    Subjective/Objective   Chief Complaint:  I feel very good just a bit sore overlying the incision here    Subjective:  Patient was seen examined at bedside  Patient denies any acute events overnight  Patient denies any nausea vomiting  Patient tolerated clear liquids this morning without any issues  Patient denies any fevers chills  Patient does have some abdominal pain over his midline umbilicus incision that he describes as being sore  Patient not passing gas or bowel movements yet  Patient has been up and out of bed and ambulating routinely  Patient has been urinating/voiding    Objective:     Blood pressure 133/88, pulse 104, temperature 97 8 °F (36 6 °C), resp  rate 19, weight 118 kg (259 lb 4 2 oz), SpO2 95 %  ,Body mass index is 35 16 kg/m²        Intake/Output Summary (Last 24 hours) at 4/12/2021 6318  Last data filed at 4/11/2021 2147  Gross per 24 hour   Intake 900 ml   Output --   Net 900 ml       Invasive Devices     Peripheral Intravenous Line            Peripheral IV 04/11/21 Left Forearm less than 1 day                Physical Exam: /88   Pulse 104   Temp 97 8 °F (36 6 °C)   Resp 19   Wt 118 kg (259 lb 4 2 oz)   SpO2 95%   BMI 35 16 kg/m²   General appearance: alert and oriented, in no acute distress  Head: Normocephalic, without obvious abnormality, atraumatic  Lungs: clear to auscultation bilaterally  Heart: regular rate and rhythm, S1, S2 normal, no murmur, click, rub or gallop  Abdomen: soft, non-tender; bowel sounds normal; no masses,  no organomegaly  Skin: Skin color, texture, turgor normal  No rashes or lesions or incisions are c/d/i    Lab, Imaging and other studies:  I have personally reviewed pertinent lab results    , CBC:   Lab Results   Component Value Date    WBC 9 28 04/12/2021    HGB 14 8 04/12/2021    HCT 43 9 04/12/2021    MCV 91 04/12/2021     04/12/2021    MCH 30 8 04/12/2021    MCHC 33 7 04/12/2021    RDW 11 9 04/12/2021    MPV 10 1 04/12/2021    NRBC 0 04/12/2021   , CMP:   Lab Results   Component Value Date    SODIUM 134 (L) 04/12/2021    K 4 0 04/12/2021     04/12/2021    CO2 25 04/12/2021    BUN 11 04/12/2021    CREATININE 0 60 04/12/2021    CALCIUM 8 5 04/12/2021    AST 18 04/11/2021    ALT 60 04/11/2021    ALKPHOS 115 04/11/2021    EGFR 112 04/12/2021     VTE Pharmacologic Prophylaxis: Heparin  VTE Mechanical Prophylaxis: sequential compression device

## 2021-04-12 NOTE — PHYSICAL THERAPY NOTE
PT EVALUATION    Patient is not in need of further skilled inpatient PT/OT at this time as pt is independent and safe for dc home  04/12/21 1345   PT Last Visit   PT Visit Date 04/12/21   Note Type   Note type Evaluation   Pain Assessment   Pain Assessment Tool 0-10   Pain Score 6   Pain Location/Orientation Location: Abdomen   Home Living   Type of Home House  (Few FARHAN with rail)   Home Layout Two level;Bed/bath upstairs;1/2 bath on main level   Prior Function   Level of [place] Independent with ADLs and functional mobility   Lives With Spouse   Receives Help From Family   ADL Assistance Independent   IADLs Independent   Comments Patient ambulates without an assistive device prior to admission   Restrictions/Precautions   Other Precautions Pain   General   Additional Pertinent History Patient admitted with a left inguinal hernia, underwent left inguinal hernia repair 04/11/2021   Family/Caregiver Present Yes  (Patient's wife)   Cognition   Overall Cognitive Status WFL   Arousal/Participation Cooperative   Orientation Level Oriented X4   Following Commands Follows all commands and directions without difficulty   RLE Assessment   RLE Assessment WFL   LLE Assessment   LLE Assessment WFL   Bed Mobility   Supine to Sit 7  Independent   Transfers   Sit to Stand 7  Independent   Stand to Sit 7  Independent   Ambulation/Elevation   Gait pattern   (Painful and guarded)   Gait Assistance   (The 1st time-S; the 2nd time-I)   Assistive Device None   Distance 150 ft with multiple change in direction x 2 reps   Stair Management Assistance   (The 1st time-S; the 2nd time-I)   Stair Management Technique Alternating pattern   Number of Stairs 3  (x 2)   Balance   Static Sitting Good   Dynamic Sitting Good   Static Standing Good   Dynamic Standing Good   Ambulatory   (F+/G)   Activity Tolerance   Activity Tolerance Patient limited by pain   Assessment   Problem List Decreased strength;Decreased endurance; Impaired balance;Decreased mobility;Pain   Assessment Patient seen for Physical Therapy evaluation  Patient admitted with Non-recurrent unilateral inguinal hernia without obstruction or gangrene  Comorbidities affecting patient's physical performance include:  Obesity  Personal factors affecting patient at time of initial evaluation include: lives in two story house and stairs to enter home  Prior to admission, patient was independent with functional mobility without assistive device, independent with ADLS, independent with IADLS, living with spouse in a two level home with few steps to enter, ambulating household distance and ambulating community distances  Please find objective findings from Physical Therapy assessment regarding body systems outlined above with impairments and limitations including weakness, decreased ROM, impaired balance, decreased endurance, gait deviations, pain, decreased activity tolerance and decreased functional mobility tolerance  The Barthel Index was used as a functional outcome tool presenting with a score of 100 today indicating no limitations of functional mobility and ADLS  Patient's clinical presentation is currently stable  as seen in patient's presentation of vital sign response, changing level of pain, new onset of impairment of functional mobility, decreased endurance and new onset of weakness  As demonstrated by objective findings, the assigned level of complexity for this evaluation is low  The patient's -[facility] Basic Mobility Inpatient Short Form Raw Score is 24, Standardized Score is 57 68  A standardized score greater than 42 9 suggests the patient may benefit from discharge to home  Please also refer to the recommendation of the Physical Therapist for safe discharge planning    Patient is not in need of further skilled inpatient physical therapy at this time as patient is independent with gait and functional mobility and will return to his prior I functional level without continued PT services  Patient's wife will assist patient as needed with ADLs, particularly assisting patient to [place]ri[place] Mo[place] and doff his socks and shoes  Goals   Patient Goals Going home today   Plan   Treatment/Interventions ADL retraining;Functional transfer training;LE strengthening/ROM; Elevations; Therapeutic exercise; Endurance training;Patient/family training;Equipment eval/education; Bed mobility;Gait training; Compensatory technique education   PT Frequency One time visit   Recommendation   PT Discharge Recommendation Return to previous environment with social support   AM-PAC Basic Mobility Inpatient   Turning in Bed Without Bedrails 4   Lying on Back to Sitting on Edge of Flat Bed 4   Moving Bed to Chair 4   Standing Up From Chair 4   Walk in Room 4   Climb 3-5 Stairs 4   Basic Mobility Inpatient Raw Score 24   Basic Mobility Standardized Score 57 68   Barthel Index   Feeding 10   Bathing 5   Grooming Score 5   Dressing Score 10   Bladder Score 10   Bowels Score 10   Toilet Use Score 10   Transfers (Bed/Chair) Score 15   Mobility (Level Surface) Score 15   Stairs Score 10   Barthel Index Score 360   Licensure   [place] License Number  Abe LUIS FELIPE [id number]

## 2021-04-12 NOTE — PLAN OF CARE
Problem: Potential for Falls  Goal: Patient will remain free of falls  Description: INTERVENTIONS:  - Assess patient frequently for physical needs  -  Identify cognitive and physical deficits and behaviors that affect risk of falls    -  [place] fall precautions as indicated by assessment   - Educate patient/family on patient safety including physical limitations  - Instruct patient to call for assistance with activity based on assessment  - Modify environment to reduce risk of injury  - Consider OT/PT consult to assist with strengthening/mobility  4/12/2021 1415 by Curley Goodell, RN  Outcome: Adequate for Discharge  4/12/2021 1338 by Curley Goodell, RN  Outcome: Progressing     Problem: GASTROINTESTINAL - ADULT  Goal: Minimal or absence of nausea and/or vomiting  Description: INTERVENTIONS:  - Administer IV fluids if ordered to ensure adequate hydration  - Maintain NPO status until nausea and vomiting are resolved  - Nasogastric tube if ordered  - Administer ordered antiemetic medications as needed  - Provide nonpharmacologic comfort measures as appropriate  - Advance diet as tolerated, if ordered  - Consider nutrition services referral to assist patient with adequate nutrition and appropriate food choices  4/12/2021 1415 by Curley Goodell, RN  Outcome: Adequate for Discharge  4/12/2021 1338 by Curley Goodell, RN  Outcome: Progressing  Goal: Maintains or returns to baseline bowel function  Description: INTERVENTIONS:  - Assess bowel function  - Encourage oral fluids to ensure adequate hydration  - Administer IV fluids if ordered to ensure adequate hydration  - Administer ordered medications as needed  - Encourage mobilization and activity  - Consider nutritional services referral to assist patient with adequate nutrition and appropriate food choices  4/12/2021 1415 by Curley Goodell, RN  Outcome: Adequate for Discharge  4/12/2021 1338 by Curley Goodell, RN  Outcome: Progressing  Goal: Maintains adequate nutritional intake  Description: INTERVENTIONS:  - Monitor percentage of each meal consumed  - Identify factors contributing to decreased intake, treat as appropriate  - Assist with meals as needed  - Monitor I&O, weight, and lab values if indicated  - Obtain nutrition services referral as needed  4/12/2021 1415 by Bahman Wisdom RN  Outcome: Adequate for Discharge  4/12/2021 1338 by Bahman Wisdom RN  Outcome: Progressing     Problem: GASTROINTESTINAL - ADULT  Goal: Maintains or returns to baseline bowel function  Description: INTERVENTIONS:  - Assess bowel function  - Encourage oral fluids to ensure adequate hydration  - Administer IV fluids if ordered to ensure adequate hydration  - Administer ordered medications as needed  - Encourage mobilization and activity  - Consider nutritional services referral to assist patient with adequate nutrition and appropriate food choices  4/12/2021 1415 by Bahman Wisdom RN  Outcome: Adequate for Discharge  4/12/2021 1338 by Bahman Wisdom RN  Outcome: Progressing     Problem: GASTROINTESTINAL - ADULT  Goal: Maintains adequate nutritional intake  Description: INTERVENTIONS:  - Monitor percentage of each meal consumed  - Identify factors contributing to decreased intake, treat as appropriate  - Assist with meals as needed  - Monitor I&O, weight, and lab values if indicated  - Obtain nutrition services referral as needed  4/12/2021 1415 by Bahman Wisdom RN  Outcome: Adequate for Discharge  4/12/2021 1338 by Bahman Wisdom RN  Outcome: Progressing

## 2021-04-20 ENCOUNTER — TELEPHONE (OUTPATIENT)
Dept: SURGERY | Facility: CLINIC | Age: 56
End: 2021-04-20

## 2021-04-20 NOTE — TELEPHONE ENCOUNTER
Spoke w pt, message sent to Dr Alexander Rodriguez   Pt is concerned with his incision and would like to speak w Dr Alexander Rodriguez

## 2021-04-21 ENCOUNTER — OFFICE VISIT (OUTPATIENT)
Dept: SURGERY | Facility: CLINIC | Age: 56
End: 2021-04-21

## 2021-04-21 VITALS
HEART RATE: 93 BPM | BODY MASS INDEX: 33.72 KG/M2 | WEIGHT: 249 LBS | HEIGHT: 72 IN | TEMPERATURE: 96.9 F | SYSTOLIC BLOOD PRESSURE: 126 MMHG | DIASTOLIC BLOOD PRESSURE: 68 MMHG

## 2021-04-21 DIAGNOSIS — K40.90 NON-RECURRENT UNILATERAL INGUINAL HERNIA WITHOUT OBSTRUCTION OR GANGRENE: Primary | ICD-10-CM

## 2021-04-21 PROCEDURE — 99024 POSTOP FOLLOW-UP VISIT: CPT | Performed by: SURGERY

## 2021-04-21 NOTE — PROGRESS NOTES
Subjective:       Melva Gibbons presents to the clinic for evaluation of his wound following an urgent surgery on 4/11/2021 for possible incarcerated left inguinal hernia and umbilical hernia repair  Patient did not have evidence of bowel ischemia but did have a lagre hernia  He has been recovering well at home but noticed as his glue started to come off of his left inguinal incision he noticed it was opening up a bit- prompting evaluation today  PATHOLOGY 4/11/21:   A  Lipoma of cord and hernia sac, Excision:  - Benign mature adipose tissue, consistent with lipoma  - Mesothelium lined fibrovascular tissue, consistent with hernia sac      B  Hernia Sac, Umbilical, Excision:  - Mesothelium lined fibroadipose tissue, consistent with hernia sac  Today, patient denies any fever or chills  He has been managing his sugars with a strict low sugar diet  He is scheduled to follow up with his PCP     Wt Readings from Last 3 Encounters:   04/21/21 113 kg (249 lb)   04/12/21 118 kg (259 lb 4 2 oz)   01/10/21 118 kg (260 lb)       Objective:      Ht 6' (1 829 m)   BMI 35 16 kg/m²     General:  alert and oriented, in no acute distress   Abdomen: soft, bowel sounds active, non-tender   Incision:   healing well, no drainage, no erythema, no hernia, no seroma, no swelling, no dehiscence, incision well approximated        On inguinal incision near lateral aspect is small area of skin epidermal dehiscence, no drainage, about the size of end of q-tip  Not able to probe   Assessment:      Doing well postoperatively        Plan:   Follow up in 2 weeks   Avoid heavy lifting for 6 weeks total   Keep area clean and dry

## 2021-05-10 ENCOUNTER — OFFICE VISIT (OUTPATIENT)
Dept: SURGERY | Facility: CLINIC | Age: 56
End: 2021-05-10

## 2021-05-10 VITALS
BODY MASS INDEX: 34 KG/M2 | SYSTOLIC BLOOD PRESSURE: 134 MMHG | DIASTOLIC BLOOD PRESSURE: 78 MMHG | HEART RATE: 86 BPM | WEIGHT: 251 LBS | HEIGHT: 72 IN | TEMPERATURE: 97.6 F

## 2021-05-10 DIAGNOSIS — K40.90 NON-RECURRENT UNILATERAL INGUINAL HERNIA WITHOUT OBSTRUCTION OR GANGRENE: Primary | ICD-10-CM

## 2021-05-10 PROCEDURE — 99024 POSTOP FOLLOW-UP VISIT: CPT | Performed by: SURGERY

## 2021-05-10 NOTE — PROGRESS NOTES
Subjective:       Daryl Jacobson presents to the clinic for evaluation of his wound following an urgent surgery on 4/11/2021 for possible incarcerated left inguinal hernia and umbilical hernia repair  Patient did not have evidence of bowel ischemia but did have a lagre hernia  He has been recovering well at home  He has been avoiding heavy lifting  Previous noted bleeding and wound opening has stopped    PATHOLOGY 4/11/21:   A  Lipoma of cord and hernia sac, Excision:  - Benign mature adipose tissue, consistent with lipoma  - Mesothelium lined fibrovascular tissue, consistent with hernia sac      B  Hernia Sac, Umbilical, Excision:  - Mesothelium lined fibroadipose tissue, consistent with hernia sac  Today, patient denies any fever or chills  He has been managing his sugars with a strict low sugar diet  He is scheduled to follow up with his PCP     Wt Readings from Last 3 Encounters:   04/21/21 113 kg (249 lb)   04/12/21 118 kg (259 lb 4 2 oz)   01/10/21 118 kg (260 lb)       Objective: There were no vitals taken for this visit  General:  alert and oriented, in no acute distress   Abdomen: soft, bowel sounds active, non-tender   Incision:   healing well, no drainage, no erythema, no hernia, no seroma, no swelling, no dehiscence, incision well approximated        Incision well healed  No erythema or drainage   Assessment:      Doing well postoperatively        Plan:   Follow up prn  Avoid heavy lifting for 6 weeks total     Advised patient to continue follow up with PCP for elevated hemoglboin A1C

## 2021-05-10 NOTE — LETTER
May 13, 2021     Patient: Nessa Wright   YOB: 1965   Date of Visit: 5/10/2021       To Whom it May Concern:    Lowell Hinkleley is under my professional care  He was seen in my office on 5/10/2021  He may return to active work duties with no restrictions on 5/24/2021  If you have any questions or concerns, please don't hesitate to call           Sincerely,          Radha Whiting MD        CC: No Recipients

## 2021-05-18 ENCOUNTER — TELEPHONE (OUTPATIENT)
Dept: SURGERY | Facility: CLINIC | Age: 56
End: 2021-05-18

## 2022-03-28 ENCOUNTER — OFFICE VISIT (OUTPATIENT)
Dept: FAMILY MEDICINE CLINIC | Facility: CLINIC | Age: 57
End: 2022-03-28
Payer: COMMERCIAL

## 2022-03-28 VITALS
DIASTOLIC BLOOD PRESSURE: 80 MMHG | HEIGHT: 73 IN | WEIGHT: 249 LBS | BODY MASS INDEX: 33 KG/M2 | RESPIRATION RATE: 18 BRPM | HEART RATE: 102 BPM | OXYGEN SATURATION: 97 % | TEMPERATURE: 97.4 F | SYSTOLIC BLOOD PRESSURE: 126 MMHG

## 2022-03-28 DIAGNOSIS — E66.9 OBESITY (BMI 30-39.9): ICD-10-CM

## 2022-03-28 DIAGNOSIS — E11.9 TYPE 2 DIABETES MELLITUS WITHOUT COMPLICATION, WITHOUT LONG-TERM CURRENT USE OF INSULIN (HCC): Primary | ICD-10-CM

## 2022-03-28 DIAGNOSIS — Z12.5 SCREENING PSA (PROSTATE SPECIFIC ANTIGEN): ICD-10-CM

## 2022-03-28 PROBLEM — K40.90 NON-RECURRENT UNILATERAL INGUINAL HERNIA WITHOUT OBSTRUCTION OR GANGRENE: Status: RESOLVED | Noted: 2021-04-11 | Resolved: 2022-03-28

## 2022-03-28 LAB
LEFT EYE DIABETIC RETINOPATHY: NORMAL
LEFT EYE IMAGE QUALITY: NORMAL
LEFT EYE MACULAR EDEMA: NORMAL
LEFT EYE OTHER RETINOPATHY: NORMAL
RIGHT EYE DIABETIC RETINOPATHY: NORMAL
RIGHT EYE IMAGE QUALITY: NORMAL
RIGHT EYE MACULAR EDEMA: NORMAL
RIGHT EYE OTHER RETINOPATHY: NORMAL
SEVERITY (EYE EXAM): NORMAL
SL AMB POCT HEMOGLOBIN AIC: 10.9 (ref ?–6.5)

## 2022-03-28 PROCEDURE — 3079F DIAST BP 80-89 MM HG: CPT | Performed by: NURSE PRACTITIONER

## 2022-03-28 PROCEDURE — 2025F 7 FLD RTA PHOTO W/O RTNOPTHY: CPT | Performed by: NURSE PRACTITIONER

## 2022-03-28 PROCEDURE — 1036F TOBACCO NON-USER: CPT | Performed by: NURSE PRACTITIONER

## 2022-03-28 PROCEDURE — 3725F SCREEN DEPRESSION PERFORMED: CPT | Performed by: NURSE PRACTITIONER

## 2022-03-28 PROCEDURE — 83036 HEMOGLOBIN GLYCOSYLATED A1C: CPT | Performed by: NURSE PRACTITIONER

## 2022-03-28 PROCEDURE — 3008F BODY MASS INDEX DOCD: CPT | Performed by: NURSE PRACTITIONER

## 2022-03-28 PROCEDURE — 92250 FUNDUS PHOTOGRAPHY W/I&R: CPT | Performed by: NURSE PRACTITIONER

## 2022-03-28 PROCEDURE — 99214 OFFICE O/P EST MOD 30 MIN: CPT | Performed by: NURSE PRACTITIONER

## 2022-03-28 NOTE — PATIENT INSTRUCTIONS
Fasting labs now  Start Meformin 1 pill daily (AM) for 1 week  Increase to 1 pill twice daily after 1 week  Then increase to 2 tablets in the morning followed by 2 tablets twice daily  Let me know if you experience any side effects or have any questions        Basic Carbohydrate Counting   AMBULATORY CARE:   Carbohydrate counting  is a way to plan your meals by counting the amount of carbohydrate in foods  Carbohydrates are the sugars, starches, and fiber found in fruit, grains, vegetables, and milk products  Carbohydrates increase your blood sugar levels  Carbohydrate counting can help you eat the right amount of carbohydrate to keep your blood sugar levels under control  What you need to know about planning meals using carbohydrate counting:  · A dietitian or healthcare provider will help you develop a healthy meal plan that works best for you  You will be taught how much carbohydrate to eat or drink for each meal and snack  Your meal plan will be based on your age, weight, usual food intake, and physical activity level  If you have diabetes, it will also include your blood sugar levels and diabetes medicine  Once you know how much carbohydrate you should eat, you can decide what type of food you want to eat  · You will need to know what foods contain carbohydrate and how much they contain  Keep track of the amount of carbohydrate in meals and snacks in order to follow your meal plan  Do not avoid carbohydrates or skip meals  Your blood sugar may fall too low if you do not eat enough carbohydrate or you skip meals  Foods that contain carbohydrate:   · Breads:  Each serving of food listed below contains about 15 g of carbohydrate   ? 1 slice of bread (1 ounce) or 1 flour or corn tortilla (6 inch)    ? ½ of a hamburger bun or ¼ of a large bagel (about 1 ounce)    ? 1 pancake (about 4 inches across and ¼ inch thick)    · Cereals and grains:  Serving sizes of ready-to-eat cereals vary   Look at the serving size and the total carbohydrate amount listed on the food label  Each serving of food listed below contains about 15 g of carbohydrate   ? ¾ cup of dry, unsweetened, ready-to-eat cereal or ¼ cup of low-fat granola     ? ½ cup of oatmeal or other cooked cereal     ? ? cup of cooked rice or pasta    · Starchy vegetables and beans:  Each serving of food listed below contains about 15 g of carbohydrate   ? ½ cup of corn, green peas, sweet potatoes, or mashed potatoes    ? ¼ of a large baked potato    ? ½ cup of beans, lentils, and peas (garbanzo, lopez, kidney, white, split, black-eyed)    · Crackers and snacks:  Each serving of food listed below contains about 15 g of carbohydrate   ? 3 tyrese cracker squares or 8 animal crackers     ? 6 saltine-type crackers    ? 3 cups of popcorn or ¾ ounce of pretzels, potato chips, or tortilla chips    · Fruit:  Each serving of food listed below contains about 15 g of carbohydrate   ? 1 small (4 ounce) piece of fresh fruit or ¾ to 1 cup of fresh fruit    ? ½ cup of canned or frozen fruit, packed in natural juice    ? ½ cup (4 ounces) of unsweetened fruit juice    ? 2 tablespoons of dried fruit    · Desserts or sugary foods:  Each serving of food listed below contains about 15 g of carbohydrate   ? 2-inch square unfrosted cake or brownie     ? 2 small cookies    ? ½ cup of ice cream, frozen yogurt, or nondairy frozen yogurt    ? ¼ cup of sherbet or sorbet    ? 1 tablespoon of regular syrup, jam, or jelly    ? 2 tablespoons of light syrup    · Milk and yogurt:  Foods from the milk group contain about 12 g of carbohydrate per serving  ? 1 cup of fat-free or low-fat milk    ? 1 cup of soy milk    ? ? cup of fat-free, yogurt sweetened with artificial sweetener    · Non-starchy vegetables:  Each serving contains about 5 g of carbohydrate   Three servings of non-starch vegetables count as 1 carbohydrate serving       ? ½ cup of cooked vegetables or 1 cup of raw vegetables  This includes beets, broccoli, cabbage, cauliflower, cucumber, mushrooms, tomatoes, and zucchini    ? ½ cup of vegetable juice    How to use carbohydrate counting to plan meals:   · Count carbohydrate amounts using serving sizes:      ? Pasta dinner example: You plan to have pasta, tossed salad, and an 8-ounce glass of milk  Your healthcare provider tells you that you may have 4 carbohydrate servings for dinner  One carbohydrate serving of pasta is ? cup  One cup of pasta will equal 3 carbohydrate servings  An 8-ounce glass of milk will count as 1 carbohydrate serving  These amounts of food would equal 4 carbohydrate servings  One cup of tossed salad does not count toward your carbohydrate servings  · Count carbohydrate amounts using food labels:  Find the total amount of carbohydrate in a packaged food by reading the food label  Food labels tell you the serving size of the food and the total carbohydrate amount in each serving  Find the serving size on the food label and then decide how many servings you will eat  Multiply the number of servings you plan to eat by the carbohydrate amount per serving  ? Granola bar snack example: Your meal plan allows you to have 2 carbohydrate servings (30 grams) of carbohydrate for a snack  You plan to eat 1 package of granola bars, which contains 2 bars  According to the food label, the serving size of food in this package is 1 bar  Each serving (1 bar) contains 25 grams of carbohydrate  The total amount of carbohydrate in this package of granola bars would be 50 g  Based on your meal plan, you should eat only 1 bar  Follow up with your doctor as directed:  Write down your questions so you remember to ask them during your visits  © Copyright [employer] 2022 Information is for End User's use only and may not be sold, redistributed or otherwise used for commercial purposes   All illustrations and images included in CareNotes® are the copyrighted property of A [employer] M , Inc  or [facility] [employer]   The above information is an  only  It is not intended as medical advice for individual conditions or treatments  Talk to your doctor, nurse or pharmacist before following any medical regimen to see if it is safe and effective for you

## 2022-03-28 NOTE — ASSESSMENT & PLAN NOTE
Reviewed labs w/ pt, his A1c was 10 8 last year  Will start on Metformin  Discussed management of diabetes with pt and his wife at length  Highly encouraged diabetic nutrition counseling  His wife is open to meeting with the dietician at [facility]    Will check labs now, reviewed how to start Metformin    Lab Results   Component Value Date    HGBA1C 10 9 (A) 03/28/2022

## 2022-03-28 NOTE — PROGRESS NOTES
Assessment/Plan:    1  Type 2 diabetes mellitus without complication, without long-term current use of insulin ([facility])  Assessment & Plan:  Reviewed labs w/ pt, his A1c was 10 8 last year  Will start on Metformin  Discussed management of diabetes with pt and his wife at length  Highly encouraged diabetic nutrition counseling  His wife is open to meeting with the dietician at [facility]  Will check labs now, reviewed how to start Metformin    Lab Results   Component Value Date    HGBA1C 10 9 (A) 03/28/2022       Orders:  -     POCT hemoglobin A1c  -     Lipid panel; Future  -     Comprehensive metabolic panel; Future  -     Microalbumin / creatinine urine ratio; Future  -     Hemoglobin A1C; Future; Expected date: 06/28/2022  -     Lipid panel; Future; Expected date: 06/28/2022  -     IRIS Diabetic eye exam  -     metFORMIN (GLUCOPHAGE) 500 mg tablet; Take 2 tablets (1,000 mg total) by mouth 2 (two) times a day with meals    2  Screening PSA (prostate specific antigen)  -     PSA, Total Screen; Future    3  Obesity (BMI 30-39  9)  Assessment & Plan:  Discussed the importance of lifestyle modification in managing his diabetes        BMI Counseling: Body mass index is 32 85 kg/m²  The BMI is above normal  Nutrition recommendations include consuming healthier snacks  Exercise recommendations include moderate physical activity 150 minutes/week  Rationale for BMI follow-up plan is due to patient being overweight or obese  Depression Screening and Follow-up Plan: Patient was screened for depression during today's encounter  They screened negative with a PHQ-2 score of 0  Patient Instructions   Fasting labs now  Start Meformin 1 pill daily (AM) for 1 week  Increase to 1 pill twice daily after 1 week  Then increase to 2 tablets in the morning followed by 2 tablets twice daily     Let me know if you experience any side effects or have any questions        Basic Carbohydrate Counting   AMBULATORY CARE: Carbohydrate counting  is a way to plan your meals by counting the amount of carbohydrate in foods  Carbohydrates are the sugars, starches, and fiber found in fruit, grains, vegetables, and milk products  Carbohydrates increase your blood sugar levels  Carbohydrate counting can help you eat the right amount of carbohydrate to keep your blood sugar levels under control  What you need to know about planning meals using carbohydrate counting:  · A dietitian or healthcare provider will help you develop a healthy meal plan that works best for you  You will be taught how much carbohydrate to eat or drink for each meal and snack  Your meal plan will be based on your age, weight, usual food intake, and physical activity level  If you have diabetes, it will also include your blood sugar levels and diabetes medicine  Once you know how much carbohydrate you should eat, you can decide what type of food you want to eat  · You will need to know what foods contain carbohydrate and how much they contain  Keep track of the amount of carbohydrate in meals and snacks in order to follow your meal plan  Do not avoid carbohydrates or skip meals  Your blood sugar may fall too low if you do not eat enough carbohydrate or you skip meals  Foods that contain carbohydrate:   · Breads:  Each serving of food listed below contains about 15 g of carbohydrate   ? 1 slice of bread (1 ounce) or 1 flour or corn tortilla (6 inch)    ? ½ of a hamburger bun or ¼ of a large bagel (about 1 ounce)    ? 1 pancake (about 4 inches across and ¼ inch thick)    · Cereals and grains:  Serving sizes of ready-to-eat cereals vary  Look at the serving size and the total carbohydrate amount listed on the food label  Each serving of food listed below contains about 15 g of carbohydrate   ? ¾ cup of dry, unsweetened, ready-to-eat cereal or ¼ cup of low-fat granola     ?  ½ cup of oatmeal or other cooked cereal     ? ? cup of cooked rice or pasta    · Starchy vegetables and beans:  Each serving of food listed below contains about 15 g of carbohydrate   ? ½ cup of corn, green peas, sweet potatoes, or mashed potatoes    ? ¼ of a large baked potato    ? ½ cup of beans, lentils, and peas (garbanzo, lopez, kidney, white, split, black-eyed)    · Crackers and snacks:  Each serving of food listed below contains about 15 g of carbohydrate   ? 3 tyrese cracker squares or 8 animal crackers     ? 6 saltine-type crackers    ? 3 cups of popcorn or ¾ ounce of pretzels, potato chips, or tortilla chips    · Fruit:  Each serving of food listed below contains about 15 g of carbohydrate   ? 1 small (4 ounce) piece of fresh fruit or ¾ to 1 cup of fresh fruit    ? ½ cup of canned or frozen fruit, packed in natural juice    ? ½ cup (4 ounces) of unsweetened fruit juice    ? 2 tablespoons of dried fruit    · Desserts or sugary foods:  Each serving of food listed below contains about 15 g of carbohydrate   ? 2-inch square unfrosted cake or brownie     ? 2 small cookies    ? ½ cup of ice cream, frozen yogurt, or nondairy frozen yogurt    ? ¼ cup of sherbet or sorbet    ? 1 tablespoon of regular syrup, jam, or jelly    ? 2 tablespoons of light syrup    · Milk and yogurt:  Foods from the milk group contain about 12 g of carbohydrate per serving  ? 1 cup of fat-free or low-fat milk    ? 1 cup of soy milk    ? ? cup of fat-free, yogurt sweetened with artificial sweetener    · Non-starchy vegetables:  Each serving contains about 5 g of carbohydrate   Three servings of non-starch vegetables count as 1 carbohydrate serving  ? ½ cup of cooked vegetables or 1 cup of raw vegetables  This includes beets, broccoli, cabbage, cauliflower, cucumber, mushrooms, tomatoes, and zucchini    ? ½ cup of vegetable juice    How to use carbohydrate counting to plan meals:   · Count carbohydrate amounts using serving sizes:      ? Pasta dinner example:   You plan to have pasta, tossed salad, and an 8-ounce glass of milk  Your healthcare provider tells you that you may have 4 carbohydrate servings for dinner  One carbohydrate serving of pasta is ? cup  One cup of pasta will equal 3 carbohydrate servings  An 8-ounce glass of milk will count as 1 carbohydrate serving  These amounts of food would equal 4 carbohydrate servings  One cup of tossed salad does not count toward your carbohydrate servings  · Count carbohydrate amounts using food labels:  Find the total amount of carbohydrate in a packaged food by reading the food label  Food labels tell you the serving size of the food and the total carbohydrate amount in each serving  Find the serving size on the food label and then decide how many servings you will eat  Multiply the number of servings you plan to eat by the carbohydrate amount per serving  ? Granola bar snack example: Your meal plan allows you to have 2 carbohydrate servings (30 grams) of carbohydrate for a snack  You plan to eat 1 package of granola bars, which contains 2 bars  According to the food label, the serving size of food in this package is 1 bar  Each serving (1 bar) contains 25 grams of carbohydrate  The total amount of carbohydrate in this package of granola bars would be 50 g  Based on your meal plan, you should eat only 1 bar  Follow up with your doctor as directed:  Write down your questions so you remember to ask them during your visits  © Copyright [employer] 2022 Information is for End User's use only and may not be sold, redistributed or otherwise used for commercial purposes  All illustrations and images included in CareNotes® are the copyrighted property of [employer] , Inc  or [facility] [employer]   The above information is an  only  It is not intended as medical advice for individual conditions or treatments   Talk to your doctor, nurse or pharmacist before following any medical regimen to see if it is safe and effective for you         Return in about 3 months (around 6/28/2022)  Subjective:      Patient ID: Shagufta Fan is a 61 y o  male  Chief Complaint   Patient presents with    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     klpn       Here today to discuss his sugar  He had a DOT exam which showed glucose in his urine  He was previously unaware of any history of elevated blood glucose or diabetes  There is a family history of diabetes  He reports chronic polydipsia  Wakes up at 3 am for work and then works until 5 or 10 at [employer] a sandwich for lunch daily  Minimal candy  Does not drink excessive ETOH  He is a , but 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 truck frequently throughout the day          The following portions of the patient's history were reviewed and updated as appropriate: allergies, current medications, past family history, past medical history, past social history, past surgical history and problem list     Review of Systems   Constitutional: Negative  Respiratory: Negative  Cardiovascular: Negative  Gastrointestinal: Negative  Endocrine: Positive for polydipsia  Negative for polyuria  Neurological: Negative  Current Outpatient Medications   Medication Sig Dispense Refill    metFORMIN (GLUCOPHAGE) 500 mg tablet Take 2 tablets (1,000 mg total) by mouth 2 (two) times a day with meals 120 tablet 3     No current facility-administered medications for this visit  Objective:    /80   Pulse 102   Temp (!) 97 4 °F (36 3 °C)   Resp 18   Ht 6' 1" (1 854 m)   Wt 113 kg (249 lb)   SpO2 97%   BMI 32 85 kg/m²        Physical Exam  Vitals and nursing note reviewed  Constitutional:       Appearance: Normal appearance  He is well-developed  HENT:      Head: Normocephalic and atraumatic  Neck:      Vascular: No carotid bruit  Cardiovascular:      Rate and Rhythm: Normal rate and regular rhythm  Pulses: Normal pulses  Heart sounds: Normal heart sounds   No murmur heard       Pulmonary:      Effort: Pulmonary effort is normal       Breath sounds: Normal breath sounds  Skin:     General: Skin is warm and dry  Neurological:      Mental Status: He is alert     Psychiatric:         Mood and Affect: Mood normal          Behavior: Behavior normal                 IRMA Odell

## 2022-03-30 LAB
ALBUMIN SERPL-MCNC: 4.8 G/DL (ref 3.8–4.9)
ALBUMIN/CREAT UR: 131 MG/G CREAT (ref 0–29)
ALBUMIN/GLOB SERPL: 2.3 {RATIO} (ref 1.2–2.2)
ALP SERPL-CCNC: 104 IU/L (ref 44–121)
ALT SERPL-CCNC: 57 IU/L (ref 0–44)
AST SERPL-CCNC: 37 IU/L (ref 0–40)
BILIRUB SERPL-MCNC: 0.4 MG/DL (ref 0–1.2)
BUN SERPL-MCNC: 15 MG/DL (ref 6–24)
BUN/CREAT SERPL: 21 (ref 9–20)
CALCIUM SERPL-MCNC: 9.9 MG/DL (ref 8.7–10.2)
CHLORIDE SERPL-SCNC: 99 MMOL/L (ref 96–106)
CHOLEST SERPL-MCNC: 221 MG/DL (ref 100–199)
CO2 SERPL-SCNC: 22 MMOL/L (ref 20–29)
CREAT SERPL-MCNC: 0.71 MG/DL (ref 0.76–1.27)
CREAT UR-MCNC: 138.5 MG/DL
EGFR: 106 ML/MIN/1.73
GLOBULIN SER-MCNC: 2.1 G/DL (ref 1.5–4.5)
GLUCOSE SERPL-MCNC: 280 MG/DL (ref 65–99)
HBA1C MFR BLD: 11.8 % (ref 4.8–5.6)
HDLC SERPL-MCNC: 33 MG/DL
LDLC SERPL CALC-MCNC: 155 MG/DL (ref 0–99)
MICROALBUMIN UR-MCNC: 181 UG/ML
MICRODELETION SYND BLD/T FISH: NORMAL
POTASSIUM SERPL-SCNC: 4.7 MMOL/L (ref 3.5–5.2)
PROT SERPL-MCNC: 6.9 G/DL (ref 6–8.5)
PSA SERPL-MCNC: 0.5 NG/ML (ref 0–4)
SL AMB VLDL CHOLESTEROL CALC: 33 MG/DL (ref 5–40)
SODIUM SERPL-SCNC: 136 MMOL/L (ref 134–144)
TRIGL SERPL-MCNC: 182 MG/DL (ref 0–149)

## 2022-03-30 PROCEDURE — 3046F HEMOGLOBIN A1C LEVEL >9.0%: CPT | Performed by: NURSE PRACTITIONER

## 2022-03-30 PROCEDURE — 3060F POS MICROALBUMINURIA REV: CPT | Performed by: NURSE PRACTITIONER

## 2022-03-31 ENCOUNTER — TELEPHONE (OUTPATIENT)
Dept: FAMILY MEDICINE CLINIC | Facility: CLINIC | Age: 57
End: 2022-03-31

## 2022-03-31 NOTE — TELEPHONE ENCOUNTER
Spoke with patient and he was seen here on 3/28 by Keri Her  He will continue care with Tamera    Will make change in the chart    No further action needed

## 2022-03-31 NOTE — TELEPHONE ENCOUNTER
Please call to confirm pt's PCP, If I am his PCP he will need an appt for diiabetic foot exam etc   If I am not his pcp please adjust in chart

## 2022-04-01 PROBLEM — E78.2 MIXED HYPERLIPIDEMIA: Status: ACTIVE | Noted: 2022-04-01

## 2022-04-04 ENCOUNTER — TELEPHONE (OUTPATIENT)
Dept: FAMILY MEDICINE CLINIC | Facility: CLINIC | Age: 57
End: 2022-04-04

## 2022-04-04 NOTE — TELEPHONE ENCOUNTER
Found forms, we have been attempting to fax them but not going through  Calling pt to  forms  Scanned into chart    NFA

## 2022-04-04 NOTE — TELEPHONE ENCOUNTER
Pt asked that I mail the forms that are not going through to Chiropractic office in [place], [address] [place] [address]

## 2022-04-04 NOTE — TELEPHONE ENCOUNTER
Patient stated he spoke with Abbe Allen on Friday and she was filling paperwork out and faxing over to specialist   Something regarding his diabetes  I do not see anything scanned in and patient wasn't sure what exactly the papers were  Can we look for this paperwork    Thank You

## 2022-04-04 NOTE — TELEPHONE ENCOUNTER
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

## 2022-06-28 DIAGNOSIS — E78.2 MIXED HYPERLIPIDEMIA: ICD-10-CM

## 2022-06-28 RX ORDER — ROSUVASTATIN CALCIUM 10 MG/1
TABLET, COATED ORAL
Qty: 90 TABLET | Refills: 1 | Status: SHIPPED | OUTPATIENT
Start: 2022-06-28

## 2022-07-23 ENCOUNTER — OFFICE VISIT (OUTPATIENT)
Dept: FAMILY MEDICINE CLINIC | Facility: CLINIC | Age: 57
End: 2022-07-23
Payer: COMMERCIAL

## 2022-07-23 VITALS
SYSTOLIC BLOOD PRESSURE: 136 MMHG | BODY MASS INDEX: 30.75 KG/M2 | DIASTOLIC BLOOD PRESSURE: 72 MMHG | HEART RATE: 84 BPM | HEIGHT: 73 IN | TEMPERATURE: 98.1 F | RESPIRATION RATE: 16 BRPM | OXYGEN SATURATION: 95 % | WEIGHT: 232 LBS

## 2022-07-23 DIAGNOSIS — E78.2 MIXED HYPERLIPIDEMIA: ICD-10-CM

## 2022-07-23 DIAGNOSIS — E11.9 TYPE 2 DIABETES MELLITUS WITHOUT COMPLICATION, WITHOUT LONG-TERM CURRENT USE OF INSULIN (HCC): Primary | ICD-10-CM

## 2022-07-23 PROCEDURE — 3078F DIAST BP <80 MM HG: CPT | Performed by: NURSE PRACTITIONER

## 2022-07-23 PROCEDURE — 99213 OFFICE O/P EST LOW 20 MIN: CPT | Performed by: NURSE PRACTITIONER

## 2022-07-23 PROCEDURE — 3075F SYST BP GE 130 - 139MM HG: CPT | Performed by: NURSE PRACTITIONER

## 2022-07-23 NOTE — ASSESSMENT & PLAN NOTE
Significantly improved with Metformin    Continue same, repeat labs in 3 months    Lab Results   Component Value Date    HGBA1C 7 1 (H) 06/25/2022

## 2022-07-23 NOTE — PROGRESS NOTES
Assessment/Plan:         1  Type 2 diabetes mellitus without complication, without long-term current use of insulin (HCC)  Assessment & Plan:  Significantly improved with Metformin  Continue same, repeat labs in 3 months    Lab Results   Component Value Date    HGBA1C 7 1 (H) 06/25/2022       Orders:  -     Comprehensive metabolic panel; Future; Expected date: 10/23/2022  -     Lipid panel; Future; Expected date: 10/23/2022  -     Hemoglobin A1C; Future; Expected date: 10/23/2022  -     Comprehensive metabolic panel  -     Lipid panel  -     Hemoglobin A1C  -     Ambulatory Referral to Podiatry; Future    2  Mixed hyperlipidemia  Assessment & Plan:  LDL at goal, tolerating statin  Continue same      Orders:  -     Comprehensive metabolic panel; Future; Expected date: 10/23/2022  -     Lipid panel; Future; Expected date: 10/23/2022  -     Hemoglobin A1C; Future; Expected date: 10/23/2022  -     Comprehensive metabolic panel  -     Lipid panel  -     Hemoglobin A1C            There are no Patient Instructions on file for this visit  Return in about 3 months (around 10/23/2022)  Subjective:      Patient ID: Ashtyn Carolina is a 61 y o  male  Chief Complaint   Patient presents with    Follow-up     Diabetic   Rafi Dan MA         Here today following up on DM  A1c over 11 three months ago, initiated on Metformin  He has been working on dietary changes, has lost weight, and is taking medication as prescribed  Reports diarrhea after a dose of Metformin if he misses the dose prior, but as long as he takes it consistently, he has no GI issues  Reports some neuropathy in his feet, difficulty wearing his work books  Using inserts in his boots  No complaints or concerns today        The following portions of the patient's history were reviewed and updated as appropriate: allergies, current medications, past family history, past medical history, past social history, past surgical history and problem list     Review of Systems   Constitutional: Negative  Respiratory: Negative  Cardiovascular: Negative  Gastrointestinal: Negative  Neurological: Negative  Current Outpatient Medications   Medication Sig Dispense Refill    metFORMIN (GLUCOPHAGE) 500 mg tablet TAKE 2 TABLETS BY MOUTH TWICE A DAY WITH FOOD 360 tablet 0    rosuvastatin (CRESTOR) 10 MG tablet TAKE 1 TABLET BY MOUTH EVERY DAY 90 tablet 1     No current facility-administered medications for this visit  Objective:    /72   Pulse 84   Temp 98 1 °F (36 7 °C)   Resp 16   Ht 6' 1" (1 854 m)   Wt 105 kg (232 lb)   SpO2 95%   BMI 30 61 kg/m²        Physical Exam  Vitals and nursing note reviewed  Constitutional:       Appearance: Normal appearance  He is well-developed  Neck:      Vascular: No carotid bruit  Cardiovascular:      Rate and Rhythm: Normal rate and regular rhythm  Pulses: Normal pulses  no weak pulses          Dorsalis pedis pulses are 2+ on the right side and 2+ on the left side  Posterior tibial pulses are 2+ on the right side and 2+ on the left side  Heart sounds: Normal heart sounds  No murmur heard  Pulmonary:      Effort: Pulmonary effort is normal       Breath sounds: Normal breath sounds  Musculoskeletal:        Feet:    Feet:      Right foot:      Skin integrity: Callus (great toe) present  No ulcer, skin breakdown, erythema, warmth or dry skin  Left foot:      Skin integrity: No ulcer, skin breakdown, erythema, warmth, callus or dry skin  Skin:     General: Skin is warm and dry  Neurological:      Mental Status: He is alert  Psychiatric:         Mood and Affect: Mood normal          Behavior: Behavior normal        Diabetic Foot Exam    Patient's shoes and socks removed  Right Foot/Ankle   Right Foot Inspection  Skin Exam: skin normal, skin intact, callus (great toe) and callus (great toe)   No dry skin, no warmth, no erythema, no maceration, no abnormal color, no pre-ulcer and no ulcer  Toe Exam: ROM and strength within normal limits  Sensory   Vibration: intact  Proprioception: intact  Monofilament testing: diminished    Vascular  Capillary refills: < 3 seconds  The right DP pulse is 2+  The right PT pulse is 2+  Left Foot/Ankle  Left Foot Inspection  Skin Exam: skin normal and skin intact  No dry skin, no warmth, no erythema, no maceration, normal color, no pre-ulcer, no ulcer and no callus  Toe Exam: ROM and strength within normal limits  Sensory   Vibration: intact  Proprioception: intact  Monofilament testing: intact    Vascular  Capillary refills: < 3 seconds  The left DP pulse is 2+  The left PT pulse is 2+       Assign Risk Category  No deformity present  Loss of protective sensation  No weak pulses  Risk: 1               IRMA Bustillo

## 2022-09-26 DIAGNOSIS — E11.9 TYPE 2 DIABETES MELLITUS WITHOUT COMPLICATION, WITHOUT LONG-TERM CURRENT USE OF INSULIN (HCC): ICD-10-CM

## 2022-10-02 LAB
ALBUMIN SERPL-MCNC: 4.8 G/DL (ref 3.8–4.9)
ALBUMIN/GLOB SERPL: 2.8 {RATIO} (ref 1.2–2.2)
ALP SERPL-CCNC: 64 IU/L (ref 44–121)
ALT SERPL-CCNC: 20 IU/L (ref 0–44)
AST SERPL-CCNC: 15 IU/L (ref 0–40)
BILIRUB SERPL-MCNC: 0.4 MG/DL (ref 0–1.2)
BUN SERPL-MCNC: 16 MG/DL (ref 6–24)
BUN/CREAT SERPL: 21 (ref 9–20)
CALCIUM SERPL-MCNC: 9.7 MG/DL (ref 8.7–10.2)
CHLORIDE SERPL-SCNC: 102 MMOL/L (ref 96–106)
CHOLEST SERPL-MCNC: 113 MG/DL (ref 100–199)
CHOLEST/HDLC SERPL: 3.1 RATIO (ref 0–5)
CO2 SERPL-SCNC: 25 MMOL/L (ref 20–29)
CREAT SERPL-MCNC: 0.76 MG/DL (ref 0.76–1.27)
EGFR: 104 ML/MIN/1.73
EST. AVERAGE GLUCOSE BLD GHB EST-MCNC: 146 MG/DL
GLOBULIN SER-MCNC: 1.7 G/DL (ref 1.5–4.5)
GLUCOSE SERPL-MCNC: 137 MG/DL (ref 70–99)
HBA1C MFR BLD: 6.7 % (ref 4.8–5.6)
HDLC SERPL-MCNC: 36 MG/DL
LDLC SERPL CALC-MCNC: 58 MG/DL (ref 0–99)
MICRODELETION SYND BLD/T FISH: NORMAL
POTASSIUM SERPL-SCNC: 4.6 MMOL/L (ref 3.5–5.2)
PROT SERPL-MCNC: 6.5 G/DL (ref 6–8.5)
SL AMB VLDL CHOLESTEROL CALC: 19 MG/DL (ref 5–40)
SODIUM SERPL-SCNC: 139 MMOL/L (ref 134–144)
TRIGL SERPL-MCNC: 97 MG/DL (ref 0–149)

## 2022-10-22 ENCOUNTER — OFFICE VISIT (OUTPATIENT)
Dept: FAMILY MEDICINE CLINIC | Facility: CLINIC | Age: 57
End: 2022-10-22
Payer: COMMERCIAL

## 2022-10-22 VITALS
SYSTOLIC BLOOD PRESSURE: 138 MMHG | DIASTOLIC BLOOD PRESSURE: 72 MMHG | BODY MASS INDEX: 30.61 KG/M2 | WEIGHT: 232 LBS | OXYGEN SATURATION: 97 % | RESPIRATION RATE: 18 BRPM | HEART RATE: 81 BPM | TEMPERATURE: 97.6 F

## 2022-10-22 DIAGNOSIS — E11.9 TYPE 2 DIABETES MELLITUS WITHOUT COMPLICATION, WITHOUT LONG-TERM CURRENT USE OF INSULIN (HCC): Primary | ICD-10-CM

## 2022-10-22 DIAGNOSIS — R07.9 CHEST PAIN, UNSPECIFIED TYPE: ICD-10-CM

## 2022-10-22 DIAGNOSIS — E78.2 MIXED HYPERLIPIDEMIA: ICD-10-CM

## 2022-10-22 DIAGNOSIS — Z12.11 SCREENING FOR COLON CANCER: ICD-10-CM

## 2022-10-22 DIAGNOSIS — Z12.5 SCREENING PSA (PROSTATE SPECIFIC ANTIGEN): ICD-10-CM

## 2022-10-22 PROCEDURE — 99214 OFFICE O/P EST MOD 30 MIN: CPT | Performed by: NURSE PRACTITIONER

## 2022-10-22 NOTE — PROGRESS NOTES
Assessment/Plan:    1  Type 2 diabetes mellitus without complication, without long-term current use of insulin ([facility] [address] [address] )  Assessment & Plan:  Well controlled  Continue Metformin    Lab Results   Component Value Date    HGBA1C 6 7 (H) 10/01/2022       Orders:  -     CBC and differential; Future; Expected date: 02/22/2023  -     Comprehensive metabolic panel; Future; Expected date: 02/22/2023  -     Lipid panel; Future; Expected date: 02/22/2023  -     Hemoglobin A1C; Future; Expected date: 02/22/2023  -     Microalbumin / creatinine urine ratio; Future; Expected date: 02/22/2023  -     CBC and differential  -     Comprehensive metabolic panel  -     Lipid panel  -     Hemoglobin A1C  -     Microalbumin / creatinine urine ratio    2  Mixed hyperlipidemia  Assessment & Plan:  LDL goal <70  Continue Crestor    Orders:  -     CBC and differential; Future; Expected date: 02/22/2023  -     Comprehensive metabolic panel; Future; Expected date: 02/22/2023  -     Lipid panel; Future; Expected date: 02/22/2023  -     Hemoglobin A1C; Future; Expected date: 02/22/2023  -     Microalbumin / creatinine urine ratio; Future; Expected date: 02/22/2023  -     CBC and differential  -     Comprehensive metabolic panel  -     Lipid panel  -     Hemoglobin A1C  -     Microalbumin / creatinine urine ratio    3  Screening for colon cancer  -     Ambulatory referral for colonoscopy; Future    4  Chest pain, unspecified type  Comments:  EKG, 2D echo, and stress test ordered   Orders:  -     Echo complete w/ contrast if indicated; Future; Expected date: 10/22/2022  -     Stress test only, exercise; Future; Expected date: 10/22/2022  -     ECG 12 lead; Future    5  Screening PSA (prostate specific antigen)  -     PSA Total (Reflex To Free); Future; Expected date: 02/22/2023  -     PSA Total (Reflex To Free)            There are no Patient Instructions on file for this visit  Return in about 4 months (around 2/22/2023)      Subjective: Patient ID: Pauline Humphreys is a 61 y o  male  Chief Complaint   Patient presents with   • Follow-up     Sas/cma       Following up on labs and chronic conditions  Doing well overall  Reports intermittent chest pains, occur randomly, takes ASA and then go away  No associated SOB, light headedness, or dizziness        The following portions of the patient's history were reviewed and updated as appropriate: allergies, current medications, past family history, past medical history, past social history, past surgical history and problem list     Review of Systems   Constitutional: Negative for chills, fatigue and fever  Respiratory: Negative for cough, shortness of breath and wheezing  Cardiovascular: Positive for chest pain  Negative for palpitations and leg swelling  Gastrointestinal: Negative for abdominal pain, diarrhea, nausea and vomiting  Skin: Negative for rash  Neurological: Negative for dizziness and headaches  Current Outpatient Medications   Medication Sig Dispense Refill   • metFORMIN (GLUCOPHAGE) 500 mg tablet TAKE 2 TABLETS BY MOUTH TWICE A DAY WITH FOOD 360 tablet 0   • rosuvastatin (CRESTOR) 10 MG tablet TAKE 1 TABLET BY MOUTH EVERY DAY 90 tablet 1     No current facility-administered medications for this visit  Objective:    /72   Pulse 81   Temp 97 6 °F (36 4 °C)   Resp 18   Wt 105 kg (232 lb)   SpO2 97%   BMI 30 61 kg/m²        Physical Exam  Vitals and nursing note reviewed  Constitutional:       Appearance: Normal appearance  He is well-developed  HENT:      Head: Normocephalic and atraumatic  Right Ear: Tympanic membrane, ear canal and external ear normal       Left Ear: Tympanic membrane, ear canal and external ear normal       Nose: No mucosal edema or rhinorrhea  Mouth/Throat:      Pharynx: Uvula midline  Eyes:      Conjunctiva/sclera: Conjunctivae normal    Neck:      Thyroid: No thyromegaly  Vascular: No carotid bruit  Cardiovascular:      Rate and Rhythm: Normal rate and regular rhythm  Pulses: Normal pulses  Heart sounds: Normal heart sounds  No murmur heard  Pulmonary:      Effort: Pulmonary effort is normal       Breath sounds: Normal breath sounds  Abdominal:      General: Bowel sounds are normal  There is no distension  Palpations: There is no hepatomegaly or splenomegaly  Tenderness: There is no abdominal tenderness  Musculoskeletal:      Cervical back: Neck supple  No edema  Lymphadenopathy:      Cervical:      Right cervical: No superficial cervical adenopathy  Left cervical: No superficial cervical adenopathy  Skin:     General: Skin is warm and dry  Findings: No rash  Neurological:      Mental Status: He is alert     Psychiatric:         Mood and Affect: Mood normal          Behavior: Behavior normal                  Tamera Stinson

## 2022-10-22 NOTE — ASSESSMENT & PLAN NOTE
Well controlled    Continue Metformin    Lab Results   Component Value Date    HGBA1C 6 7 (H) 10/01/2022

## 2022-12-26 DIAGNOSIS — E11.9 TYPE 2 DIABETES MELLITUS WITHOUT COMPLICATION, WITHOUT LONG-TERM CURRENT USE OF INSULIN (HCC): ICD-10-CM

## 2022-12-26 DIAGNOSIS — E78.2 MIXED HYPERLIPIDEMIA: ICD-10-CM

## 2022-12-27 ENCOUNTER — HOSPITAL ENCOUNTER (OUTPATIENT)
Dept: NON INVASIVE DIAGNOSTICS | Facility: HOSPITAL | Age: 57
Discharge: HOME | End: 2022-12-27

## 2022-12-27 VITALS
DIASTOLIC BLOOD PRESSURE: 71 MMHG | HEART RATE: 82 BPM | WEIGHT: 232 LBS | HEIGHT: 73 IN | SYSTOLIC BLOOD PRESSURE: 138 MMHG | BODY MASS INDEX: 30.75 KG/M2

## 2022-12-27 VITALS — BODY MASS INDEX: 30.75 KG/M2 | WEIGHT: 232 LBS | HEIGHT: 73 IN

## 2022-12-27 DIAGNOSIS — R07.9 CHEST PAIN, UNSPECIFIED TYPE: ICD-10-CM

## 2022-12-27 LAB
AORTIC ROOT: 3.6 CM
AORTIC VALVE MEAN VELOCITY: 7.3 M/S
APICAL FOUR CHAMBER EJECTION FRACTION: 65 %
ASCENDING AORTA: 3.4 CM
AV AREA BY CONTINUOUS VTI: 4.9 CM2
AV AREA PEAK VELOCITY: 4.6 CM2
AV LVOT MEAN GRADIENT: 2 MMHG
AV LVOT PEAK GRADIENT: 3 MMHG
AV MEAN GRADIENT: 2 MMHG
AV PEAK GRADIENT: 4 MMHG
AV VALVE AREA: 4.92 CM2
AV VELOCITY RATIO: 0.93
DOP CALC AO PEAK VEL: 0.98 M/S
DOP CALC AO VTI: 18.71 CM
DOP CALC LVOT AREA: 4.91 CM2
DOP CALC LVOT DIAMETER: 2.5 CM
DOP CALC LVOT PEAK VEL VTI: 18.77 CM
DOP CALC LVOT PEAK VEL: 0.91 M/S
DOP CALC LVOT STROKE INDEX: 41 ML/M2
DOP CALC LVOT STROKE VOLUME: 92.09 CM3
E WAVE DECELERATION TIME: 167 MS
FRACTIONAL SHORTENING: 38 % (ref 28–44)
INTERVENTRICULAR SEPTUM IN DIASTOLE (PARASTERNAL SHORT AXIS VIEW): 1.2 CM
INTERVENTRICULAR SEPTUM: 1.2 CM (ref 0.6–1.1)
LAAS-AP2: 20.7 CM2
LAAS-AP4: 23.1 CM2
LEFT ATRIUM SIZE: 4.3 CM
LEFT INTERNAL DIMENSION IN SYSTOLE: 2.6 CM (ref 2.1–4)
LEFT VENTRICULAR INTERNAL DIMENSION IN DIASTOLE: 4.2 CM (ref 3.5–6)
LEFT VENTRICULAR POSTERIOR WALL IN END DIASTOLE: 1 CM
LEFT VENTRICULAR STROKE VOLUME: 55 ML
LVSV (TEICH): 55 ML
MAX HR PERCENT: 94 %
MAX HR: 153 BPM
MV E'TISSUE VEL-LAT: 10 CM/S
MV E'TISSUE VEL-SEP: 7 CM/S
MV PEAK A VEL: 0.82 M/S
MV PEAK E VEL: 58 CM/S
MV STENOSIS PRESSURE HALF TIME: 48 MS
MV VALVE AREA P 1/2 METHOD: 4.58 CM2
RATE PRESSURE PRODUCT: NORMAL
RIGHT ATRIAL 2D VOLUME: 60 ML
RIGHT ATRIUM AREA SYSTOLE A4C: 19.5 CM2
RIGHT VENTRICLE ID DIMENSION: 4.1 CM
SL CV LEFT ATRIUM LENGTH A2C: 6 CM
SL CV LV EF: 65
SL CV PED ECHO LEFT VENTRICLE DIASTOLIC VOLUME (MOD BIPLANE) 2D: 80 ML
SL CV PED ECHO LEFT VENTRICLE SYSTOLIC VOLUME (MOD BIPLANE) 2D: 25 ML
SL CV STRESS RECOVERY BP: NORMAL MMHG
SL CV STRESS RECOVERY HR: 108 BPM
SL CV STRESS RECOVERY O2 SAT: 97 %
SL CV STRESS STAGE REACHED: 3
STRESS ANGINA INDEX: 0
STRESS BASELINE BP: NORMAL MMHG
STRESS BASELINE HR: 99 BPM
STRESS O2 SAT REST: 94 %
STRESS PEAK HR: 153 BPM
STRESS POST ESTIMATED WORKLOAD: 10.1 METS
STRESS POST EXERCISE DUR MIN: 9 MIN
STRESS POST O2 SAT PEAK: 98 %
STRESS POST PEAK BP: 206 MMHG

## 2022-12-27 RX ORDER — ROSUVASTATIN CALCIUM 10 MG/1
TABLET, COATED ORAL
Qty: 90 TABLET | Refills: 1 | Status: SHIPPED | OUTPATIENT
Start: 2022-12-27

## 2022-12-29 LAB
ARRHY DURING EX: NORMAL
CHEST PAIN STATEMENT: NORMAL
MAX DIASTOLIC BP: 66 MMHG
MAX HEART RATE: 153 BPM
MAX PREDICTED HEART RATE: 162 BPM
MAX. SYSTOLIC BP: 206 MMHG
PROTOCOL NAME: NORMAL
TARGET HR FORMULA: NORMAL
TEST INDICATION: NORMAL
TIME IN EXERCISE PHASE: NORMAL

## 2023-01-22 LAB
ALBUMIN SERPL-MCNC: 5 G/DL (ref 3.8–4.9)
ALBUMIN/CREAT UR: 30 MG/G CREAT (ref 0–29)
ALBUMIN/GLOB SERPL: 2.9 {RATIO} (ref 1.2–2.2)
ALP SERPL-CCNC: 68 IU/L (ref 44–121)
ALT SERPL-CCNC: 26 IU/L (ref 0–44)
AST SERPL-CCNC: 17 IU/L (ref 0–40)
BASOPHILS # BLD AUTO: 0 X10E3/UL (ref 0–0.2)
BASOPHILS NFR BLD AUTO: 1 %
BILIRUB SERPL-MCNC: 0.4 MG/DL (ref 0–1.2)
BUN SERPL-MCNC: 20 MG/DL (ref 6–24)
BUN/CREAT SERPL: 30 (ref 9–20)
CALCIUM SERPL-MCNC: 9.5 MG/DL (ref 8.7–10.2)
CHLORIDE SERPL-SCNC: 100 MMOL/L (ref 96–106)
CHOLEST SERPL-MCNC: 131 MG/DL (ref 100–199)
CHOLEST/HDLC SERPL: 3.5 RATIO (ref 0–5)
CO2 SERPL-SCNC: 23 MMOL/L (ref 20–29)
CREAT SERPL-MCNC: 0.67 MG/DL (ref 0.76–1.27)
CREAT UR-MCNC: 67.8 MG/DL
EGFR: 108 ML/MIN/1.73
EOSINOPHIL # BLD AUTO: 0.1 X10E3/UL (ref 0–0.4)
EOSINOPHIL NFR BLD AUTO: 2 %
ERYTHROCYTE [DISTWIDTH] IN BLOOD BY AUTOMATED COUNT: 12.2 % (ref 11.6–15.4)
EST. AVERAGE GLUCOSE BLD GHB EST-MCNC: 154 MG/DL
GLOBULIN SER-MCNC: 1.7 G/DL (ref 1.5–4.5)
GLUCOSE SERPL-MCNC: 129 MG/DL (ref 70–99)
HBA1C MFR BLD: 7 % (ref 4.8–5.6)
HCT VFR BLD AUTO: 45.1 % (ref 37.5–51)
HDLC SERPL-MCNC: 37 MG/DL
HGB BLD-MCNC: 15.5 G/DL (ref 13–17.7)
IMM GRANULOCYTES # BLD: 0 X10E3/UL (ref 0–0.1)
IMM GRANULOCYTES NFR BLD: 0 %
LDLC SERPL CALC-MCNC: 75 MG/DL (ref 0–99)
LYMPHOCYTES # BLD AUTO: 1.7 X10E3/UL (ref 0.7–3.1)
LYMPHOCYTES NFR BLD AUTO: 27 %
MCH RBC QN AUTO: 30.7 PG (ref 26.6–33)
MCHC RBC AUTO-ENTMCNC: 34.4 G/DL (ref 31.5–35.7)
MCV RBC AUTO: 89 FL (ref 79–97)
MICROALBUMIN UR-MCNC: 20.2 UG/ML
MICRODELETION SYND BLD/T FISH: NORMAL
MONOCYTES # BLD AUTO: 0.5 X10E3/UL (ref 0.1–0.9)
MONOCYTES NFR BLD AUTO: 9 %
NEUTROPHILS # BLD AUTO: 3.8 X10E3/UL (ref 1.4–7)
NEUTROPHILS NFR BLD AUTO: 61 %
PLATELET # BLD AUTO: 243 X10E3/UL (ref 150–450)
POTASSIUM SERPL-SCNC: 4.5 MMOL/L (ref 3.5–5.2)
PROT SERPL-MCNC: 6.7 G/DL (ref 6–8.5)
PSA SERPL-MCNC: 0.4 NG/ML (ref 0–4)
RBC # BLD AUTO: 5.05 X10E6/UL (ref 4.14–5.8)
SL AMB REFLEX CRITERIA: NORMAL
SL AMB VLDL CHOLESTEROL CALC: 19 MG/DL (ref 5–40)
SODIUM SERPL-SCNC: 135 MMOL/L (ref 134–144)
TRIGL SERPL-MCNC: 104 MG/DL (ref 0–149)
WBC # BLD AUTO: 6.2 X10E3/UL (ref 3.4–10.8)

## 2023-01-26 ENCOUNTER — TELEPHONE (OUTPATIENT)
Dept: FAMILY MEDICINE CLINIC | Facility: CLINIC | Age: 58
End: 2023-01-26

## 2023-01-26 NOTE — TELEPHONE ENCOUNTER
Spoke to patient and he stated that he would like to schedule a Saturday appt  But the time that I gave him did not work so he scheduled an appt on 3/1 @ 9am  Patient would like a call back to discuss blood work    Jovita Hernandes

## 2023-01-26 NOTE — TELEPHONE ENCOUNTER
Patient calling stating he would like to speak with Santiago Runner  When asked what it was in regards to he stated he just wanted to speak with her        Please call Brayan Ken at [phone]    Rishi Lombardi

## 2023-01-27 ENCOUNTER — TELEPHONE (OUTPATIENT)
Dept: GASTROENTEROLOGY | Facility: CLINIC | Age: 58
End: 2023-01-27

## 2023-01-27 NOTE — TELEPHONE ENCOUNTER
Patient screened for OA in referral   Patient is scheduled for colonoscopy on February 28 , 2023 at [address] [place] [address] with Kenisha Cortez MD  Patient is aware of pre-procedure prep of Miralax/Dulcolax and they will be called the day prior between 2 and 6 pm for time to report for procedure  Pre-procedure prep has been given to the patient  via [id number] mail on January 27 , 2023

## 2023-01-31 NOTE — TELEPHONE ENCOUNTER
L/M for pt, advised I sent him a message with brief overview of labs and we will review in detail at f/u  Asked him to call back if he had any specific questions or concerns   Rocío Momin

## 2023-02-15 ENCOUNTER — TELEPHONE (OUTPATIENT)
Dept: FAMILY MEDICINE CLINIC | Facility: CLINIC | Age: 58
End: 2023-02-15

## 2023-02-15 ENCOUNTER — TELEPHONE (OUTPATIENT)
Dept: OTHER | Facility: OTHER | Age: 58
End: 2023-02-15

## 2023-02-15 ENCOUNTER — TELEPHONE (OUTPATIENT)
Dept: GASTROENTEROLOGY | Facility: AMBULARY SURGERY CENTER | Age: 58
End: 2023-02-15

## 2023-02-15 DIAGNOSIS — Z12.11 SCREENING FOR COLON CANCER: Primary | ICD-10-CM

## 2023-02-15 DIAGNOSIS — E11.9 TYPE 2 DIABETES MELLITUS WITHOUT COMPLICATION, WITHOUT LONG-TERM CURRENT USE OF INSULIN (HCC): ICD-10-CM

## 2023-02-15 DIAGNOSIS — E78.2 MIXED HYPERLIPIDEMIA: Primary | ICD-10-CM

## 2023-02-15 NOTE — TELEPHONE ENCOUNTER
Called and spoke with patient  He has been having pain in his left hip and left leg on and off  He has been taking the arthritis pain relief but it is not really helping  Patient is worried because he is on a statin and thinks his calcium levels have been depleted    Mayda Howe CMA

## 2023-02-15 NOTE — TELEPHONE ENCOUNTER
Spoke to the patient and informed him  He stated that he will get it done  Closing this task     Sierra Bender, EUNICE

## 2023-02-15 NOTE — TELEPHONE ENCOUNTER
Spoke to pt/pt confirmed his colonoscopy at [facility] 2/28/2023/pt confirmed HE RECEIVED EMAILED GOLYTELY/DULCOLAX BOWEL PREP INSTRUCTIONS AND DIABETIC INSTRUCTION SHEET

## 2023-02-15 NOTE — TELEPHONE ENCOUNTER
This would be an unlikely presentation for side effects related to a statin, but I would like to check his liver enzymes, and the panel would also include a calcium level    He does not have to fast  IRMA Duron

## 2023-02-19 LAB
ALBUMIN SERPL-MCNC: 4.9 G/DL (ref 3.8–4.9)
ALBUMIN/GLOB SERPL: 2.5 {RATIO} (ref 1.2–2.2)
ALP SERPL-CCNC: 81 IU/L (ref 44–121)
ALT SERPL-CCNC: 19 IU/L (ref 0–44)
AST SERPL-CCNC: 13 IU/L (ref 0–40)
BILIRUB SERPL-MCNC: 0.3 MG/DL (ref 0–1.2)
BUN SERPL-MCNC: 24 MG/DL (ref 6–24)
BUN/CREAT SERPL: 32 (ref 9–20)
CALCIUM SERPL-MCNC: 10 MG/DL (ref 8.7–10.2)
CHLORIDE SERPL-SCNC: 104 MMOL/L (ref 96–106)
CO2 SERPL-SCNC: 23 MMOL/L (ref 20–29)
CREAT SERPL-MCNC: 0.76 MG/DL (ref 0.76–1.27)
EGFR: 104 ML/MIN/1.73
GLOBULIN SER-MCNC: 2 G/DL (ref 1.5–4.5)
GLUCOSE SERPL-MCNC: 156 MG/DL (ref 70–99)
POTASSIUM SERPL-SCNC: 4.9 MMOL/L (ref 3.5–5.2)
PROT SERPL-MCNC: 6.9 G/DL (ref 6–8.5)
SODIUM SERPL-SCNC: 139 MMOL/L (ref 134–144)

## 2023-02-28 ENCOUNTER — ANESTHESIA (OUTPATIENT)
Dept: GASTROENTEROLOGY | Facility: AMBULARY SURGERY CENTER | Age: 58
End: 2023-02-28

## 2023-02-28 ENCOUNTER — HOSPITAL ENCOUNTER (OUTPATIENT)
Dept: GASTROENTEROLOGY | Facility: AMBULARY SURGERY CENTER | Age: 58
Setting detail: OUTPATIENT SURGERY
Discharge: HOME | End: 2023-02-28
Attending: INTERNAL MEDICINE

## 2023-02-28 ENCOUNTER — ANESTHESIA EVENT (OUTPATIENT)
Dept: GASTROENTEROLOGY | Facility: AMBULARY SURGERY CENTER | Age: 58
End: 2023-02-28

## 2023-02-28 VITALS
OXYGEN SATURATION: 95 % | SYSTOLIC BLOOD PRESSURE: 156 MMHG | HEART RATE: 87 BPM | TEMPERATURE: 98.5 F | RESPIRATION RATE: 18 BRPM | DIASTOLIC BLOOD PRESSURE: 88 MMHG

## 2023-02-28 DIAGNOSIS — Z12.12 SCREENING FOR COLORECTAL CANCER: ICD-10-CM

## 2023-02-28 DIAGNOSIS — Z12.11 SCREENING FOR COLORECTAL CANCER: ICD-10-CM

## 2023-02-28 LAB — GLUCOSE SERPL-MCNC: 141 MG/DL (ref 65–140)

## 2023-02-28 RX ORDER — PROPOFOL 10 MG/ML
INJECTION, EMULSION INTRAVENOUS AS NEEDED
Status: DISCONTINUED | OUTPATIENT
Start: 2023-02-28 | End: 2023-02-28

## 2023-02-28 RX ORDER — SODIUM CHLORIDE, SODIUM LACTATE, POTASSIUM CHLORIDE, CALCIUM CHLORIDE 600; 310; 30; 20 MG/100ML; MG/100ML; MG/100ML; MG/100ML
INJECTION, SOLUTION INTRAVENOUS CONTINUOUS PRN
Status: DISCONTINUED | OUTPATIENT
Start: 2023-02-28 | End: 2023-02-28

## 2023-02-28 RX ORDER — PROPOFOL 10 MG/ML
INJECTION, EMULSION INTRAVENOUS CONTINUOUS PRN
Status: DISCONTINUED | OUTPATIENT
Start: 2023-02-28 | End: 2023-02-28

## 2023-02-28 RX ORDER — LIDOCAINE HYDROCHLORIDE 10 MG/ML
INJECTION, SOLUTION EPIDURAL; INFILTRATION; INTRACAUDAL; PERINEURAL AS NEEDED
Status: DISCONTINUED | OUTPATIENT
Start: 2023-02-28 | End: 2023-02-28

## 2023-02-28 RX ORDER — SODIUM CHLORIDE, SODIUM LACTATE, POTASSIUM CHLORIDE, CALCIUM CHLORIDE 600; 310; 30; 20 MG/100ML; MG/100ML; MG/100ML; MG/100ML
125 INJECTION, SOLUTION INTRAVENOUS CONTINUOUS
Status: DISCONTINUED | OUTPATIENT
Start: 2023-02-28 | End: 2023-03-04 | Stop reason: HOSPADM

## 2023-02-28 RX ADMIN — PROPOFOL 100 MG: 10 INJECTION, EMULSION INTRAVENOUS at 08:06

## 2023-02-28 RX ADMIN — SODIUM CHLORIDE, SODIUM LACTATE, POTASSIUM CHLORIDE, AND CALCIUM CHLORIDE 125 ML/HR: .6; .31; .03; .02 INJECTION, SOLUTION INTRAVENOUS at 07:22

## 2023-02-28 RX ADMIN — LIDOCAINE HYDROCHLORIDE 50 MG: 10 INJECTION, SOLUTION EPIDURAL; INFILTRATION; INTRACAUDAL at 08:06

## 2023-02-28 RX ADMIN — PROPOFOL 120 MCG/KG/MIN: 10 INJECTION, EMULSION INTRAVENOUS at 08:06

## 2023-02-28 RX ADMIN — SODIUM CHLORIDE, SODIUM LACTATE, POTASSIUM CHLORIDE, AND CALCIUM CHLORIDE: .6; .31; .03; .02 INJECTION, SOLUTION INTRAVENOUS at 08:01

## 2023-02-28 NOTE — ANESTHESIA PREPROCEDURE EVALUATION
Procedure:  COLONOSCOPY    Relevant Problems   CARDIO   (+) Mixed hyperlipidemia      ENDO   (+) Type 2 diabetes mellitus without complication, without long-term current use of insulin (HCC)      Other   (+) Former tobacco use   (+) Obesity (BMI 30-39  9)        Physical Exam    Airway    Mallampati score: II  TM Distance: >3 FB  Neck ROM: full     Dental   Comment: Denies loose teeth,     Cardiovascular  Cardiovascular exam normal    Pulmonary  Pulmonary exam normal     Other Findings  Portions of exam deferred due to low yield and/or unknown COVID status      Anesthesia Plan  ASA Score- 2     Anesthesia Type- IV sedation with anesthesia with ASA Monitors  Additional Monitors:   Airway Plan:           Plan Factors-Exercise tolerance (METS): >4 METS  Chart reviewed  Existing labs reviewed  Patient summary reviewed  Patient is not a current smoker  Induction- intravenous  Postoperative Plan-     Informed Consent- Anesthetic plan and risks discussed with patient  I personally reviewed this patient with the CRNA  Discussed and agreed on the Anesthesia Plan with the CRNA  Bib London

## 2023-02-28 NOTE — ANESTHESIA POSTPROCEDURE EVALUATION
Post-Op Assessment Note    CV Status:  Stable  Pain Score: 0    Pain management: adequate     Mental Status:  Sleepy   Hydration Status:  Stable   PONV Controlled:  None   Airway Patency:  Patent   Two or more mitigation strategies used for obstructive sleep apnea   Post Op Vitals Reviewed: Yes      Staff: CRNA         No notable events documented      BP   138/84   Temp      Pulse 91   Resp 16   SpO2 99

## 2023-02-28 NOTE — H&P
History and Physical - [facility]  Nishant Mariano 61 y o  male MRN: [id number]                  HPI: Nishant Mariano is a 61y o  year old male who presents for open access screening colonoscopy  REVIEW OF SYSTEMS: Per the HPI, and otherwise unremarkable  Historical Information   Past Medical History:   Diagnosis Date   • Diabetes mellitus ([address] )    • Hyperlipidemia      Past Surgical History:   Procedure Laterality Date   • HERNIA REPAIR Left 2021    Procedure: REPAIR HERNIA INGUINAL, umbilical hernia repair;  Surgeon: Ra Kapadia MD;  Location: [address] [place] [address];  Service: General   • PALATE / Cecile Starring / EXCISION     • TONSILLECTOMY AND ADENOIDECTOMY     • VASECTOMY       Social History   Social History     Substance and Sexual Activity   Alcohol Use Yes    Comment: rare     Social History     Substance and Sexual Activity   Drug Use No     Social History     Tobacco Use   Smoking Status Former   • Packs/day: 1 50   • Types: Cigarettes   • Quit date: 3/28/2017   • Years since quittin 9   Smokeless Tobacco Never     Family History   Problem Relation Age of Onset   • Diabetes Father        Meds/Allergies       Current Outpatient Medications:   •  metFORMIN (GLUCOPHAGE) 500 mg tablet  •  polyethylene glycol (GOLYTELY) 4000 mL solution  •  rosuvastatin (CRESTOR) 10 MG tablet    Current Facility-Administered Medications:   •  lactated ringers infusion, 125 mL/hr, Intravenous, Continuous, 125 mL/hr at 23 1854    Allergies   Allergen Reactions   • Indocin [Indomethacin] Vomiting       Objective     /79   Pulse 103   Temp 98 5 °F (36 9 °C) (Temporal)   Resp 20   SpO2 95%       PHYSICAL EXAM    Gen: NAD  Head: NCAT  CV: RRR  CHEST: Clear  ABD: soft, NT/ND  EXT: no edema      ASSESSMENT/PLAN:  This is a 61y o  year old male here for colonoscopy, and he is stable and optimized for his procedure

## 2023-03-01 ENCOUNTER — OFFICE VISIT (OUTPATIENT)
Dept: FAMILY MEDICINE CLINIC | Facility: CLINIC | Age: 58
End: 2023-03-01

## 2023-03-01 VITALS
WEIGHT: 238.2 LBS | DIASTOLIC BLOOD PRESSURE: 72 MMHG | SYSTOLIC BLOOD PRESSURE: 136 MMHG | HEIGHT: 73 IN | TEMPERATURE: 97.3 F | BODY MASS INDEX: 31.57 KG/M2 | HEART RATE: 82 BPM | RESPIRATION RATE: 18 BRPM

## 2023-03-01 DIAGNOSIS — E78.2 MIXED HYPERLIPIDEMIA: ICD-10-CM

## 2023-03-01 DIAGNOSIS — E11.9 TYPE 2 DIABETES MELLITUS WITHOUT COMPLICATION, WITHOUT LONG-TERM CURRENT USE OF INSULIN (HCC): Primary | ICD-10-CM

## 2023-03-01 DIAGNOSIS — M25.852 LEFT HIP IMPINGEMENT SYNDROME: ICD-10-CM

## 2023-03-01 RX ORDER — KETOROLAC TROMETHAMINE 30 MG/ML
60 INJECTION, SOLUTION INTRAMUSCULAR; INTRAVENOUS ONCE
Status: COMPLETED | OUTPATIENT
Start: 2023-03-01 | End: 2023-03-01

## 2023-03-01 RX ADMIN — KETOROLAC TROMETHAMINE 60 MG: 30 INJECTION, SOLUTION INTRAMUSCULAR; INTRAVENOUS at 09:26

## 2023-03-01 NOTE — PROGRESS NOTES
Assessment/Plan:    Recommended PT for his hip, however it is difficult for him to fit this in with his work schedule  Toradol given in office, also requesting ortho referral    1  Type 2 diabetes mellitus without complication, without long-term current use of insulin (HCC)  Assessment & Plan:  Doing well on current medications, continue same    Lab Results   Component Value Date    HGBA1C 7 0 (H) 01/21/2023       Orders:  -     Hemoglobin A1C; Future; Expected date: 03/01/2023  -     Lipid Panel with Direct LDL reflex; Future; Expected date: 03/01/2023  -     Comprehensive metabolic panel; Future; Expected date: 03/01/2023    2  Left hip impingement syndrome  -     ketorolac (TORADOL) 60 mg/2 mL IM injection 60 mg  -     Ambulatory Referral to Orthopedic Surgery; Future    3  Mixed hyperlipidemia  Assessment & Plan: Tolerating statin, continue same  Will check labs     Orders:  -     Hemoglobin A1C; Future; Expected date: 03/01/2023  -     Lipid Panel with Direct LDL reflex; Future; Expected date: 03/01/2023  -     Comprehensive metabolic panel; Future; Expected date: 03/01/2023            There are no Patient Instructions on file for this visit  Return in about 3 months (around 6/1/2023)  Subjective:      Patient ID: Sonja Odell is a 61 y o  male  Chief Complaint   Patient presents with   • Follow-up     Blood work review nm lpn       Having left hip pain that radiates into his knee  Has to lift his leg to get into the car  Left anterior hip  Was taking 5 Advil liquid gels at bedtime, which helps  Also tried Aleve which didn't help  Sharp shooting pain into his knee  Had colonoscopy, abnormal mucosa, GI attributes to NSAIDs      DOT CPE on 3/18       The following portions of the patient's history were reviewed and updated as appropriate: allergies, current medications, past family history, past medical history, past social history, past surgical history and problem list     Review of Systems Constitutional: Negative  Respiratory: Negative  Cardiovascular: Negative  Gastrointestinal: Negative  Musculoskeletal:        See HPI   Neurological: Negative  Current Outpatient Medications   Medication Sig Dispense Refill   • metFORMIN (GLUCOPHAGE) 500 mg tablet TAKE 2 TABLETS BY MOUTH TWICE A DAY WITH FOOD 360 tablet 0   • rosuvastatin (CRESTOR) 10 MG tablet TAKE 1 TABLET BY MOUTH EVERY DAY 90 tablet 1     No current facility-administered medications for this visit  Objective:    /72   Pulse 82   Temp (!) 97 3 °F (36 3 °C)   Resp 18   Ht 6' 1" (1 854 m)   Wt 108 kg (238 lb 3 2 oz)   BMI 31 43 kg/m²        Physical Exam  Vitals and nursing note reviewed  Constitutional:       Appearance: Normal appearance  He is well-developed  Cardiovascular:      Rate and Rhythm: Normal rate and regular rhythm  Pulses: Normal pulses  Heart sounds: Normal heart sounds  No murmur heard  Pulmonary:      Effort: Pulmonary effort is normal       Breath sounds: Normal breath sounds  Musculoskeletal:      Left hip: Tenderness present  Decreased range of motion  Comments: Left hip normal passive ROM, active limited    Skin:     General: Skin is warm and dry  Neurological:      Mental Status: He is alert     Psychiatric:         Mood and Affect: Mood normal          Behavior: Behavior normal                 IRMA Jenkins

## 2023-03-06 NOTE — ASSESSMENT & PLAN NOTE
Doing well on current medications, continue same    Lab Results   Component Value Date    HGBA1C 7 0 (H) 01/21/2023

## 2023-03-20 ENCOUNTER — OFFICE VISIT (OUTPATIENT)
Dept: FAMILY MEDICINE CLINIC | Facility: CLINIC | Age: 58
End: 2023-03-20

## 2023-03-20 VITALS
TEMPERATURE: 96.5 F | RESPIRATION RATE: 16 BRPM | WEIGHT: 232 LBS | BODY MASS INDEX: 30.75 KG/M2 | HEART RATE: 84 BPM | DIASTOLIC BLOOD PRESSURE: 70 MMHG | OXYGEN SATURATION: 99 % | SYSTOLIC BLOOD PRESSURE: 128 MMHG | HEIGHT: 73 IN

## 2023-03-20 DIAGNOSIS — J20.9 ACUTE BRONCHITIS, UNSPECIFIED ORGANISM: Primary | ICD-10-CM

## 2023-03-20 DIAGNOSIS — M25.552 LEFT HIP PAIN: ICD-10-CM

## 2023-03-20 DIAGNOSIS — E11.9 TYPE 2 DIABETES MELLITUS WITHOUT COMPLICATION, WITHOUT LONG-TERM CURRENT USE OF INSULIN (HCC): ICD-10-CM

## 2023-03-20 RX ORDER — DEXTROMETHORPHAN HYDROBROMIDE AND PROMETHAZINE HYDROCHLORIDE 15; 6.25 MG/5ML; MG/5ML
5 SOLUTION ORAL 4 TIMES DAILY PRN
Qty: 240 ML | Refills: 0 | Status: SHIPPED | OUTPATIENT
Start: 2023-03-20 | End: 2023-03-27

## 2023-03-20 RX ORDER — AZITHROMYCIN 250 MG/1
TABLET, FILM COATED ORAL
Qty: 6 TABLET | Refills: 0 | Status: SHIPPED | OUTPATIENT
Start: 2023-03-20 | End: 2023-03-25

## 2023-03-20 NOTE — PROGRESS NOTES
Assessment/Plan:    Take abx until finished  Reviewed symptomatic treatment  F/u for any persistent/worsening symptoms    Hip pain-  He cannot go for PT d/t his work schedule  Will check Xray and he will schedule with ortho    1  Acute bronchitis, unspecified organism  -     azithromycin (ZITHROMAX) 250 mg tablet; 2 tabs PO day 1, then 1 tab PO days 2-5  -     Promethazine-DM (PHENERGAN-DM) 6 25-15 mg/5 mL oral syrup; Take 5 mL by mouth 4 (four) times a day as needed for cough for up to 7 days    2  Left hip pain  -     XR hip/pelv 2-3 vws left if performed; Future; Expected date: 03/20/2023    3  Type 2 diabetes mellitus without complication, without long-term current use of insulin ([facility])  Assessment & Plan:  Stable   Lab Results   Component Value Date    HGBA1C 7 0 (H) 01/21/2023           BMI Counseling: Body mass index is 30 61 kg/m²  The BMI is above normal  Nutrition recommendations include consuming healthier snacks  Exercise recommendations include moderate physical activity 150 minutes/week  Rationale for BMI follow-up plan is due to patient being overweight or obese  There are no Patient Instructions on file for this visit  Return if symptoms worsen or fail to improve, for Next scheduled follow up  Subjective:      Patient ID: Anabella Pryor is a 61 y o  male  Chief Complaint   Patient presents with   • Cough     Onset: about 1 week  Did 2 covid tests which were negative  Mucinex is not helping  Michaelle España CMA    • Nasal Congestion   • Shortness of Breath   • Sore Throat   • Fatigue   • Generalized Body Aches   • Headache   • Dizziness   • no appetite      Patient has been eating mostly liquids  Michaelle España CMA        He has been sick for the past week with URI Symptoms  Has had a sore throat, cough, body aches, weakness, fatigue  Gets dizzy with exertion  Feels a little better today, was able to get some sleep last night, slept all day yesterday     No fevers, but has had chills  Covid testing negative  Left hip pain ongoing- initially improved with Toradol, however now back to same level of pain  Cannot go for PT d/t work schedule      The following portions of the patient's history were reviewed and updated as appropriate: allergies, current medications, past family history, past medical history, past social history, past surgical history and problem list     Review of Systems   Constitutional: Positive for chills and fatigue  Negative for fever  HENT: Positive for congestion (clear) and sore throat  Negative for ear pain, postnasal drip, rhinorrhea and sinus pressure  Respiratory: Positive for cough and chest tightness  Negative for shortness of breath and wheezing  Cardiovascular: Negative for chest pain  Gastrointestinal: Negative for abdominal pain, diarrhea, nausea and vomiting  Musculoskeletal: Positive for arthralgias  Skin: Negative for rash  Neurological: Positive for dizziness  Negative for headaches  Current Outpatient Medications   Medication Sig Dispense Refill   • azithromycin (ZITHROMAX) 250 mg tablet 2 tabs PO day 1, then 1 tab PO days 2-5 6 tablet 0   • metFORMIN (GLUCOPHAGE) 500 mg tablet TAKE 2 TABLETS BY MOUTH TWICE A DAY WITH FOOD 360 tablet 0   • Promethazine-DM (PHENERGAN-DM) 6 25-15 mg/5 mL oral syrup Take 5 mL by mouth 4 (four) times a day as needed for cough for up to 7 days 240 mL 0   • rosuvastatin (CRESTOR) 10 MG tablet TAKE 1 TABLET BY MOUTH EVERY DAY 90 tablet 1     No current facility-administered medications for this visit  Objective:    /70   Pulse 84   Temp (!) 96 5 °F (35 8 °C)   Resp 16   Ht 6' 1" (1 854 m)   Wt 105 kg (232 lb)   SpO2 99%   BMI 30 61 kg/m²        Physical Exam  Vitals and nursing note reviewed  Constitutional:       Appearance: Normal appearance  He is well-developed     HENT:      Right Ear: Tympanic membrane normal       Left Ear: Tympanic membrane normal       Nose: Congestion present  Mouth/Throat:      Pharynx: Oropharynx is clear  Eyes:      Conjunctiva/sclera: Conjunctivae normal    Cardiovascular:      Rate and Rhythm: Normal rate and regular rhythm  Pulses: Normal pulses  Heart sounds: Normal heart sounds  No murmur heard  Pulmonary:      Effort: Pulmonary effort is normal       Breath sounds: Normal breath sounds  Lymphadenopathy:      Cervical: No cervical adenopathy  Skin:     General: Skin is warm and dry  Neurological:      Mental Status: He is alert     Psychiatric:         Mood and Affect: Mood normal          Behavior: Behavior normal                 IRMA Ochoa

## 2023-03-20 NOTE — LETTER
March 20, 2023     Patient: Anabella Pryor  YOB: 1965  Date of Visit: 3/20/2023      To Whom it May Concern:    Michael Cruz is under my professional care  Anastasiia Rutherford was seen in my office on 3/20/2023  Please excuse from work 3/15/23-3/21/23  If you have any questions or concerns, please don't hesitate to call           Sincerely,          IRMA Nixon        CC: No Recipients

## 2023-03-30 DIAGNOSIS — E11.9 TYPE 2 DIABETES MELLITUS WITHOUT COMPLICATION, WITHOUT LONG-TERM CURRENT USE OF INSULIN (HCC): ICD-10-CM

## 2023-03-31 ENCOUNTER — OFFICE VISIT (OUTPATIENT)
Dept: OBGYN CLINIC | Facility: CLINIC | Age: 58
End: 2023-03-31

## 2023-03-31 ENCOUNTER — TELEPHONE (OUTPATIENT)
Dept: PAIN MEDICINE | Facility: CLINIC | Age: 58
End: 2023-03-31

## 2023-03-31 VITALS
BODY MASS INDEX: 32.2 KG/M2 | HEART RATE: 84 BPM | DIASTOLIC BLOOD PRESSURE: 84 MMHG | TEMPERATURE: 98 F | HEIGHT: 73 IN | SYSTOLIC BLOOD PRESSURE: 128 MMHG | WEIGHT: 243 LBS

## 2023-03-31 DIAGNOSIS — M16.12 PRIMARY OSTEOARTHRITIS OF ONE HIP, LEFT: Primary | ICD-10-CM

## 2023-03-31 DIAGNOSIS — M25.552 LEFT HIP PAIN: ICD-10-CM

## 2023-03-31 DIAGNOSIS — M54.16 LEFT LUMBAR RADICULOPATHY: ICD-10-CM

## 2023-03-31 NOTE — TELEPHONE ENCOUNTER
----- Message from [facility] sent at 3/31/2023 10:20 AM EDT -----  Regarding: left hip FL guided intra-articular CSI  Please schedule him for a left hip FL guided intra-articular CSI

## 2023-03-31 NOTE — TELEPHONE ENCOUNTER
Scheduled pt for hip on 4/5/23  Went over pre-procedure instructions below:  Nothing to eat or drink 1 hr prior to procedure  Need to arrange transportation  Proper clothing for procedure  No vaccines 2 weeks prior or after procedure  If ill or placed on antibiotics please call to reschedule

## 2023-03-31 NOTE — PROGRESS NOTES
Assessment/Plan:  1  Primary osteoarthritis of one hip, left        2  Left hip pain  Ambulatory Referral to Orthopedic Surgery      3  Left lumbar radiculopathy  Ambulatory Referral to Physical Therapy        Scribe Attestation    I,:  Lilo Malone am acting as a scribe while in the presence of the attending physician :       I,:  Leilani Pa, DO personally performed the services described in this documentation    as scribed in my presence :             Dann Chamberlain is a pleasant 60 y o  male with mild to moderate left hip osteoarthritis and left lumbar radiculopathy  Patient is here as a consult from his PCP  The etiology/physiology was discussed along with treatment options  He was advised to maintain appropriate weight, avoid high impact activities and stay active  We discussed conservative treatment options prior to surgical discussion  I would recommend a left hip intra-articular CSI under FL guidance  He was advised to cut back on the use of Advil as he is taking too much  He was advised to take 650 MG of Tylenol, 2 tabs every 8 hours for break through pain control after undergoing the left hip FL guided intra-articular CSI  PT was ordered for lumbar radiculopathy exercises  If he is unable to attend formal therapy 1-3x a week he was advised to attend 1-2 sessions for a good HEP  I will see him back in the office 3 months after the FL guided left hip intra-articular CSI  Subjective: Left hip pain     Patient ID: Sarah Pfeiffer is a 60 y o  male  HPI  Dann Chamberlain is a 60 y o  male who presents to the office for left hip pain  I am seeing Dann Chamberlain in consultation at the request of Abrahan Underwood  He notes pain to the lateral aspect of his left hip that radiates to his foot  He describes the radiating pain as sharp and burning  Pain has been ongoing since after a fall, which he believes was around November  He notes groin pain at night   Dann Chamberlain notes pain with worsen when he puts on his shoes or socks, when he is sleeping or when he gets into his truck  He feels he has to lift his left in order to get it into the truck  His PCP did give him a Toradol injection due to hip impingement syndrome  He notes that the Toradol injection was beneficial for aprox  1 day  He is taking Advil for pain control, which is causing bowl issues after undergoing a colonoscopy  He notes he is taking 10-15 tabs of Advil daily  He is a diabetic  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  Review of Systems   Constitutional: Positive for activity change  Negative for chills, fever and unexpected weight change  HENT: Negative for hearing loss, nosebleeds and sore throat  Eyes: Negative for pain, redness and visual disturbance  Respiratory: Negative for cough, shortness of breath and wheezing  Cardiovascular: Negative for chest pain, palpitations and leg swelling  Gastrointestinal: Negative for abdominal pain, nausea and vomiting  Endocrine: Negative for polydipsia and polyuria  Genitourinary: Negative for difficulty urinating and hematuria  Musculoskeletal: Positive for arthralgias and myalgias  Negative for joint swelling  Skin: Negative for rash and wound  Neurological: Negative for dizziness, numbness and headaches  Psychiatric/Behavioral: Negative for decreased concentration, dysphoric mood and suicidal ideas  The patient is not nervous/anxious            Past Medical History:   Diagnosis Date   • Diabetes mellitus ([facility] [address] )    • Hyperlipidemia        Past Surgical History:   Procedure Laterality Date   • HERNIA REPAIR Left 2021    Procedure: REPAIR HERNIA INGUINAL, umbilical hernia repair;  Surgeon: Jamar Soriano MD;  Location: [facility];  Service: General   • PALATE / Ronelle  BIOPSY / EXCISION     • TONSILLECTOMY AND ADENOIDECTOMY     • VASECTOMY         Family History   Problem Relation Age of Onset   • Diabetes Father        Social History     Occupational History   • Not on file   Tobacco Use   • Smoking status: Former     Packs/day: 1 50     Years: 40 00     Pack years: 60 00     Types: Cigarettes     Quit date: 3/28/2017     Years since quittin 0   • Smokeless tobacco: Never   Vaping Use   • Vaping Use: Never used   Substance and Sexual Activity   • Alcohol use: Not Currently     Comment: rare   • Drug use: No   • Sexual activity: Yes     Partners: Female     Birth control/protection: None         Current Outpatient Medications:   •  metFORMIN (GLUCOPHAGE) 500 mg tablet, TAKE 2 TABLETS BY MOUTH TWICE A DAY WITH FOOD, Disp: 360 tablet, Rfl: 0  •  rosuvastatin (CRESTOR) 10 MG tablet, TAKE 1 TABLET BY MOUTH EVERY DAY, Disp: 90 tablet, Rfl: 1    Allergies   Allergen Reactions   • Indocin [Indomethacin] Vomiting       Objective:  Vitals:    23 0920   BP: 128/84   Pulse: 84   Temp: 98 °F (36 7 °C)       Body mass index is 32 06 kg/m²  Left Hip Exam     Tenderness   The patient is experiencing tenderness in the anterior  Range of Motion   Abduction: 45   Adduction: 25   Extension: 0   Flexion: 100   Left hip external rotation: 45  Internal rotation: 10     Muscle Strength   Abduction: 5/5   Adduction: 5/5   Flexion: 5/5     Other   Erythema: absent  Scars: absent  Sensation: normal  Pulse: present    Comments:  + Stinchfeild test   Abduction out to 30 causes pain in the groin, cross to 20  Equivocal left straight leg raise   Sensation intact to light touch             Physical Exam  Vitals and nursing note reviewed  Constitutional:       Appearance: Normal appearance  He is well-developed  HENT:      Head: Normocephalic and atraumatic  Right Ear: External ear normal       Left Ear: External ear normal       Nose: Nose normal    Eyes:      Extraocular Movements: Extraocular movements intact  Conjunctiva/sclera: Conjunctivae normal    Cardiovascular:      Rate and Rhythm: Normal rate  Pulses: Normal pulses     Pulmonary:      Effort: Pulmonary effort is normal    Musculoskeletal: Cervical back: Normal range of motion and neck supple  Comments: See ortho exam    Skin:     General: Skin is warm and dry  Capillary Refill: Capillary refill takes less than 2 seconds  Neurological:      General: No focal deficit present  Mental Status: He is alert and oriented to person, place, and time  Psychiatric:         Mood and Affect: Mood normal          Behavior: Behavior normal          Thought Content: Thought content normal          Judgment: Judgment normal          I have personally reviewed pertinent films in PACS  X-rays of the left hip which demonstrate mild to moderate left hip osteoarthritis  No lytic or blastic lesions  This document was created using speech voice recognition software  Grammatical errors, random word insertions, pronoun errors, and incomplete sentences are an occasional consequence of this system due to software limitations, ambient noise, and hardware issues  Any formal questions or concerns about content, text, or information contained within the body of this dictation should be directly addressed to the provider for clarification

## 2023-04-05 ENCOUNTER — HOSPITAL ENCOUNTER (OUTPATIENT)
Facility: AMBULARY SURGERY CENTER | Age: 58
Setting detail: OUTPATIENT SURGERY
Discharge: HOME | End: 2023-04-05
Attending: PHYSICAL MEDICINE & REHABILITATION | Admitting: PHYSICAL MEDICINE & REHABILITATION

## 2023-04-05 ENCOUNTER — HOSPITAL ENCOUNTER (OUTPATIENT)
Dept: RADIOLOGY | Facility: HOSPITAL | Age: 58
Setting detail: OUTPATIENT SURGERY
Discharge: HOME | End: 2023-04-05

## 2023-04-05 VITALS
RESPIRATION RATE: 16 BRPM | OXYGEN SATURATION: 96 % | HEART RATE: 83 BPM | TEMPERATURE: 97.7 F | SYSTOLIC BLOOD PRESSURE: 138 MMHG | DIASTOLIC BLOOD PRESSURE: 74 MMHG

## 2023-04-05 DIAGNOSIS — Z92.241 S/P EPIDURAL STEROID INJECTION: ICD-10-CM

## 2023-04-05 RX ORDER — LIDOCAINE HYDROCHLORIDE 10 MG/ML
INJECTION, SOLUTION EPIDURAL; INFILTRATION; INTRACAUDAL; PERINEURAL AS NEEDED
Status: DISCONTINUED | OUTPATIENT
Start: 2023-04-05 | End: 2023-04-05 | Stop reason: HOSPADM

## 2023-04-05 RX ORDER — BUPIVACAINE HYDROCHLORIDE 2.5 MG/ML
INJECTION, SOLUTION EPIDURAL; INFILTRATION; INTRACAUDAL AS NEEDED
Status: DISCONTINUED | OUTPATIENT
Start: 2023-04-05 | End: 2023-04-05 | Stop reason: HOSPADM

## 2023-04-05 RX ORDER — METHYLPREDNISOLONE ACETATE 40 MG/ML
INJECTION, SUSPENSION INTRA-ARTICULAR; INTRALESIONAL; INTRAMUSCULAR; SOFT TISSUE AS NEEDED
Status: DISCONTINUED | OUTPATIENT
Start: 2023-04-05 | End: 2023-04-05 | Stop reason: HOSPADM

## 2023-04-05 NOTE — H&P
History of Present Illness: The patient is a 60 y o  male who presents with complaints of left hip pain    Past Medical History:   Diagnosis Date   • Diabetes mellitus ([address] )    • Hyperlipidemia        Past Surgical History:   Procedure Laterality Date   • HERNIA REPAIR Left 4/11/2021    Procedure: REPAIR HERNIA INGUINAL, umbilical hernia repair;  Surgeon: Daphnie Velarde MD;  Location: [address] [place] [address];  Service: General   • PALATE / Allyson Sandra / EXCISION     • TONSILLECTOMY AND ADENOIDECTOMY     • VASECTOMY         No current facility-administered medications for this encounter      Allergies   Allergen Reactions   • Indocin [Indomethacin] Vomiting       Physical Exam:   Vitals:    04/05/23 1026   BP: 137/78   Pulse: 89   Resp: 16   Temp: 97 7 °F (36 5 °C)   SpO2: 96%     General: Awake, Alert, Oriented x 3, Mood and affect appropriate  Respiratory: Respirations even and unlabored  Cardiovascular: Peripheral pulses intact; no edema  Musculoskeletal Exam: Tenderness palpation left lateral hip    ASA Score: 2    Patient/Chart Verification  Patient ID Verified: Verbal, Armband  ID Band Applied: Yes  Consents Confirmed: Procedural  H&P( within 30 days) Verified: Yes  Interval H&P(within 24 hr) Complete (required for Outpatients and Surgery Admit only): Yes  Beta Blocker given : N/A  Pre-op Lab/Test Results Available: N/A  Does Patient Have a Prosthetic Device/Implant: No    Assessment: Hip arthritis  Plan: Left hip injection

## 2023-04-05 NOTE — OP NOTE
OPERATIVE REPORT  PATIENT NAME: Hughie Harada    :  1965  MRN: [id number]  Pt Location: [facility] MINOR/PAIN ROOM 01    SURGERY DATE: 2023    Surgeon(s) and Role:     * DO Alla Blank Primary    Preop Diagnosis:  Primary osteoarthritis of left hip [M16 12]  Left hip pain [M25 552]    Post-Op Diagnosis Codes:     * Primary osteoarthritis of left hip [M16 12]     * Left hip pain [M25 552]    Procedure(s):  Left - intra articular hip joint injection ([id number])    Indication:  Hip pain  Preoperative diagnosis:                     Hip arthropathy  Postoperative diagnosis:                     Hip arthropathy     Procedure:  Fluoroscopically-guided left intraarticular hip injection     EBL:  none  Specimens:  not applicable        After discussing the risks, benefits, and alternatives to the procedure, the patient expressed understanding and wished to proceed  The patient was brought to the fluoroscopic suite and placed in the supine position  Procedural pause conducted to verify:  correct patient identity, procedure to be performed, and as applicable, correct side and site, correct patient position, and availability of implants, special equipment and special requirements  The femoral artery was palpated  Using fluoroscopy, an AP view was utilized to visualize the hip joint  Next, a point at the junction of the ipsilateral femoral head and femoral neck was identified, and noted to be a safe distance lateral to the pulsation of the aforementioned femoral artery  The skin was sterilely prepped and draped in the usual fashion using Chloraprep skin prep  The skin and subcutaneous tissues were anesthetized with 1% lidocaine  Using fluoroscopic guidance, a 3 5 inch 22 gauge spinal needle was advanced into the joint capsule  After negative aspiration, Omnipaque 240 contrast was injected which confirmed intra-articular placement without evidence of intravascular uptake   A 4 ml solution consisting of 80 mg of Depo-Medrol in 3 mL of 0 25% bupivacaine was injected  The needle was withdrawn from the skin  The patient tolerated the procedure well, and there were no apparent complications  After appropriate observation, the patient was dismissed from the outpatient procedure area in good condition under their own power                                      SIGNATURE: Jelly Wilson DO  DATE: April 5, 2023  TIME: 10:46 AM

## 2023-04-05 NOTE — TELEPHONE ENCOUNTER
Caller: Jose Arce PT    Doctor: Dr Loly Clifford     Reason for call: Burning sensations follow the procedure      Call back#: [phone]

## 2023-04-05 NOTE — DISCHARGE INSTRUCTIONS
Steroid Joint Injection   WHAT YOU NEED TO KNOW:   A steroid joint injection is a procedure to inject steroid medicine into a joint  Steroid medicine decreases pain and inflammation  The injection may also contain an anesthetic (numbing medicine) to decrease pain  It may be done to treat conditions such as arthritis, gout, or carpal tunnel syndrome  The injections may be given in your knee, ankle, shoulder, elbow, wrist, ankle or sacroiliac joint  Do not apply heat to any area that is numb  If you have discomfort or soreness at the injection site, you may apply ice today, 20 minutes on and 20 minutes off  Tomorrow you may use ice or warm, moist heat  Do not apply ice or heat directly to the skin  You may have an increase or change in the discomfort for 36-48 hours after your treatment  Apply ice and continue with any pain medicine you have been prescribed  Do not do anything strenuous today  You may shower, but no tub baths or hot tubs today  You may resume your normal activities tomorrow, but do not “overdo it”  Resume normal activities slowly when you are feeling better  If you experience redness, drainage or swelling at the injection site, or if you develop a fever above 100 degrees, please call [facility] at [phone] or go to the Emergency Room  Continue to take all routine medicines prescribed by your primary care physician unless otherwise instructed by our staff  Most blood thinners should be started again according to your regularly scheduled dosing  If you have any questions, please give our office a call  As no general anesthesia was used in today's procedure, you should not experience any side effects related to anesthesia  If you are diabetic, the steroids used in today's injection may temporarily increase your blood sugar levels after the first few days after your injection   Please keep a close eye on your sugars and alert the doctor who manages your diabetes if your sugars are significantly high from your baseline or you are symptomatic  If you have a problem specifically related to your procedure, please call our office at [phone]  Problems not related to your procedure should be directed to your primary care physician

## 2023-04-05 NOTE — TELEPHONE ENCOUNTER
S/W pt S/P left hip inj today  States having burning pain from hip down leg  Denies s/s of infection, redness, swelling or fever  Took 4 Advil just now  Advised as per D/C instruction, can have increased pain for 1-2 days following inj and that it can take steroid 3-5 days to kick in  Advised to continue with IB and use ice to area  Any other recs?

## 2023-06-26 DIAGNOSIS — E78.2 MIXED HYPERLIPIDEMIA: ICD-10-CM

## 2023-06-26 DIAGNOSIS — E11.9 TYPE 2 DIABETES MELLITUS WITHOUT COMPLICATION, WITHOUT LONG-TERM CURRENT USE OF INSULIN (HCC): ICD-10-CM

## 2023-06-26 RX ORDER — ROSUVASTATIN CALCIUM 10 MG/1
TABLET, COATED ORAL
Qty: 90 TABLET | Refills: 1 | Status: SHIPPED | OUTPATIENT
Start: 2023-06-26

## 2023-07-12 ENCOUNTER — OFFICE VISIT (OUTPATIENT)
Dept: OBGYN CLINIC | Facility: CLINIC | Age: 58
End: 2023-07-12
Payer: COMMERCIAL

## 2023-07-12 ENCOUNTER — TELEPHONE (OUTPATIENT)
Dept: PAIN MEDICINE | Facility: CLINIC | Age: 58
End: 2023-07-12

## 2023-07-12 VITALS
WEIGHT: 251.8 LBS | SYSTOLIC BLOOD PRESSURE: 124 MMHG | BODY MASS INDEX: 33.37 KG/M2 | HEART RATE: 87 BPM | DIASTOLIC BLOOD PRESSURE: 77 MMHG | HEIGHT: 73 IN

## 2023-07-12 DIAGNOSIS — M25.552 LEFT HIP PAIN: ICD-10-CM

## 2023-07-12 DIAGNOSIS — M16.12 PRIMARY OSTEOARTHRITIS OF ONE HIP, LEFT: Primary | ICD-10-CM

## 2023-07-12 DIAGNOSIS — M54.16 LEFT LUMBAR RADICULOPATHY: ICD-10-CM

## 2023-07-12 PROCEDURE — 99214 OFFICE O/P EST MOD 30 MIN: CPT | Performed by: ORTHOPAEDIC SURGERY

## 2023-07-12 NOTE — TELEPHONE ENCOUNTER
Caller: Dirk fletcher/ [facility]     Doctor: Taiwo Gifford     Reason for call: Aye Counter is needed for patients MRI . They can do auth or if the office does their own they will need one done as the appointment is 7/22/23 .      Please advise     Call back#: [phone]

## 2023-07-12 NOTE — TELEPHONE ENCOUNTER
I called and lmom that he needs to obtain disc and have facility fax us an MRI report for this to [phone].

## 2023-07-12 NOTE — TELEPHONE ENCOUNTER
Pt wanted Dr Samuel Ballesteros to know his MRI is scheduled for Saturday 7/22/23 in [facility] at [address] [place] [address].

## 2023-07-12 NOTE — PROGRESS NOTES
Assessment/Plan:  1. Primary osteoarthritis of one hip, left  MRI hip left wo contrast      2. Left hip pain  MRI hip left wo contrast      3. Left lumbar radiculopathy          Scribe Attestation    I,:  Kassie Parks am acting as a scribe while in the presence of the attending physician.:       I,:  Divina Whitfield, DO personally performed the services described in this documentation    as scribed in my presence.:         Juan Antonio Leon is a pleasant 67-year-old gentleman who returns today for follow-up evaluation of his left hip pain. He did receive significant relief from the intra-articular injection administered by Dr. Rocky Golden. His pain has since returned. I explained that the intensity of his pain is out of proportion to the findings on his x-ray. I recommended obtaining an MRI of the left hip to evaluate for focal chondral defect or other internal derangement that may be contributing to his symptoms. I do believe he would benefit from total hip arthroplasty pending the findings of the MRI. He plans to undergo the study at an outside facility and understands to bring his imaging disc and radiology report with him for review at his next visit. We will see him back after the MRI is completed to review the results and further delineate his plan of care. Subjective: Follow-up evaluation for left hip pain    Patient ID: Alem Garcia is a 60 y.o. male who returns today for follow-up evaluation of his left hip pain. He underwent intra-articular injection for his left hip with Dr. Rocky Golden on 4/5/2023. At today's visit, he reports that this provided significant relief for him. His pain was almost completely resolved. The benefit from the injection has since worn off. He describes severe pain about his left hip again. This occurs with most activity, but he finds dressing and driving particularly difficult. He did attempt to participate in physical therapy but this increases symptoms so he discontinued.   He denies any new injury or trauma. He does anticipate snf in September. Review of Systems   Constitutional: Positive for activity change. Negative for chills, fever and unexpected weight change. HENT: Negative for hearing loss, nosebleeds and sore throat. Eyes: Negative for pain, redness and visual disturbance. Respiratory: Negative for cough, shortness of breath and wheezing. Cardiovascular: Negative for chest pain, palpitations and leg swelling. Gastrointestinal: Negative for abdominal pain, nausea and vomiting. Endocrine: Negative for polyphagia and polyuria. Genitourinary: Negative for dysuria and hematuria. Musculoskeletal: Positive for arthralgias and myalgias. Negative for joint swelling. See HPI   Skin: Negative for rash and wound. Neurological: Negative for dizziness, numbness and headaches. Psychiatric/Behavioral: Negative for decreased concentration and suicidal ideas. The patient is not nervous/anxious. Past Medical History:   Diagnosis Date   • Diabetes mellitus ([address])    • Hyperlipidemia        Past Surgical History:   Procedure Laterality Date   • HERNIA REPAIR Left 2021    Procedure: REPAIR HERNIA INGUINAL, umbilical hernia repair;  Surgeon: Kacey Soni MD;  Location: [facility];  Service: General   • PALATE / Mauricio Cater BIOPSY / EXCISION     • NM ARTHROCENTESIS ASPIR&/INJ MAJOR JT/BURSA W/O  Left 2023    Procedure: intra articular hip joint injection ([id number]);   Surgeon: Chino Villarreal DO;  Location: [facility] MAIN OR;  Service: Pain Management    • TONSILLECTOMY AND ADENOIDECTOMY     • VASECTOMY         Family History   Problem Relation Age of Onset   • Diabetes Father        Social History     Occupational History   • Not on file   Tobacco Use   • Smoking status: Former     Packs/day: 1.50     Years: 40.00     Total pack years: 60.00     Types: Cigarettes     Quit date: 3/28/2017     Years since quittin.2   • Smokeless tobacco: Never Vaping Use   • Vaping Use: Never used   Substance and Sexual Activity   • Alcohol use: Not Currently     Comment: rare   • Drug use: No   • Sexual activity: Yes     Partners: Female     Birth control/protection: None         Current Outpatient Medications:   •  metFORMIN (GLUCOPHAGE) 500 mg tablet, TAKE 2 TABLETS BY MOUTH TWICE A DAY WITH FOOD, Disp: 360 tablet, Rfl: 0  •  rosuvastatin (CRESTOR) 10 MG tablet, TAKE 1 TABLET BY MOUTH EVERY DAY, Disp: 90 tablet, Rfl: 1    Allergies   Allergen Reactions   • Indocin [Indomethacin] Vomiting       Objective:  Vitals:    07/12/23 0948   BP: 124/77   Pulse: 87       Body mass index is 33.22 kg/m². Left Hip Exam     Tenderness   The patient is experiencing tenderness in the anterior. Range of Motion   Abduction: 30   Adduction: 25   Extension: 0   Flexion: 100   Left hip external rotation: 45. Internal rotation: 10     Muscle Strength   Abduction: 5/5   Adduction: 5/5   Flexion: 5/5     Tests   WILLIAM: positive    Other   Erythema: absent  Scars: absent  Sensation: normal  Pulse: present    Comments:  + Stinchfeild test   Abduction out to 30 causes pain in the groin, cross to 20  Equivocal left straight leg raise   Sensation intact to light touch             Physical Exam  Vitals and nursing note reviewed. Constitutional:       Appearance: Normal appearance. He is well-developed. HENT:      Head: Normocephalic and atraumatic. Right Ear: External ear normal.      Left Ear: External ear normal.      Nose: Nose normal.   Eyes:      General: No scleral icterus. Extraocular Movements: Extraocular movements intact. Conjunctiva/sclera: Conjunctivae normal.   Cardiovascular:      Rate and Rhythm: Normal rate. Pulmonary:      Effort: Pulmonary effort is normal. No respiratory distress. Musculoskeletal:      Cervical back: Normal range of motion and neck supple. Comments: See Ortho exam   Skin:     General: Skin is warm and dry.    Neurological: General: No focal deficit present. Mental Status: He is alert and oriented to person, place, and time. Psychiatric:         Behavior: Behavior normal.         I have personally reviewed pertinent films in PACS. X-ray of the left hip from an outside source dated 3/20/2023 reviewed demonstrating mild to moderate degenerative change with inferior narrowing and osteophytosis. There is no acute fracture, dislocation, lytic or blastic lesion. This document was created using speech voice recognition software. Grammatical errors, random word insertions, pronoun errors, and incomplete sentences are an occasional consequence of this system due to software limitations, ambient noise, and hardware issues. Any formal questions or concerns about content, text, or information contained within the body of this dictation should be directly addressed to the provider for clarification.

## 2023-07-25 ENCOUNTER — TELEPHONE (OUTPATIENT)
Dept: OBGYN CLINIC | Facility: HOSPITAL | Age: 58
End: 2023-07-25

## 2023-07-25 NOTE — TELEPHONE ENCOUNTER
Caller: Patient    Doctor: John Patricia    Reason for call: Patient is calling stating he had his MRI done. He would like if Dr John Patricia wants the CD dropped off to view or did he already get the images.     Call back#: [phone]

## 2023-07-25 NOTE — TELEPHONE ENCOUNTER
Patient will drop CD off tomorrow at the [place] office since we will be there. Please let us know when uploaded so we can review.

## 2023-07-26 ENCOUNTER — TELEPHONE (OUTPATIENT)
Dept: OBGYN CLINIC | Facility: CLINIC | Age: 58
End: 2023-07-26

## 2023-07-26 DIAGNOSIS — E11.9 TYPE 2 DIABETES MELLITUS WITHOUT COMPLICATION, WITHOUT LONG-TERM CURRENT USE OF INSULIN (HCC): Primary | ICD-10-CM

## 2023-07-27 ENCOUNTER — TELEPHONE (OUTPATIENT)
Dept: OBGYN CLINIC | Facility: CLINIC | Age: 58
End: 2023-07-27

## 2023-07-27 NOTE — TELEPHONE ENCOUNTER
Caller: Patient    Doctor: Tomasa Rahman    Reason for call: Patient calling to see if his records have been reviewed. Please advise.     Call back#: [phone]

## 2023-07-27 NOTE — TELEPHONE ENCOUNTER
Patient was notified of the date and time of this documentation. We discussed the results of his left hip MRI that I was able to review the images as well as the report. We discussed that he has some insertional tendinitis of his gluteus medius and minimus around the left hip medius and minimus around the left hip with some associated sharp bursitis. Is intra-articular cartilage surfaces appear to be well-maintained without focal chondral defects. Joint space is rather well-maintained. No AVN present. We also discussed the presence of a cyst in the right femoral neck that diffusely appears benign but an examination and further questioning regarding it will occur during his next appointment. He was in agreement of this. I transferred him to my medical assistant at which point we were able to coordinate a timely appointment for him to undergo a left hip trochanteric bursa injection and evaluation of his right hip. José Miguel Jaramillo D.O.   Division of Adult Reconstruction  Department of Orthopaedic Surgery  [facility]

## 2023-08-03 ENCOUNTER — OFFICE VISIT (OUTPATIENT)
Dept: OBGYN CLINIC | Facility: CLINIC | Age: 58
End: 2023-08-03
Payer: COMMERCIAL

## 2023-08-03 VITALS
HEIGHT: 73 IN | WEIGHT: 250 LBS | DIASTOLIC BLOOD PRESSURE: 80 MMHG | SYSTOLIC BLOOD PRESSURE: 130 MMHG | HEART RATE: 85 BPM | BODY MASS INDEX: 33.13 KG/M2

## 2023-08-03 DIAGNOSIS — M16.12 PRIMARY OSTEOARTHRITIS OF ONE HIP, LEFT: ICD-10-CM

## 2023-08-03 DIAGNOSIS — M25.552 LEFT HIP PAIN: ICD-10-CM

## 2023-08-03 DIAGNOSIS — M70.62 TROCHANTERIC BURSITIS OF LEFT HIP: Primary | ICD-10-CM

## 2023-08-03 DIAGNOSIS — M89.9 LESION OF RIGHT FEMUR: ICD-10-CM

## 2023-08-03 PROCEDURE — 99214 OFFICE O/P EST MOD 30 MIN: CPT | Performed by: ORTHOPAEDIC SURGERY

## 2023-08-03 PROCEDURE — 20610 DRAIN/INJ JOINT/BURSA W/O US: CPT | Performed by: ORTHOPAEDIC SURGERY

## 2023-08-03 RX ADMIN — BUPIVACAINE HYDROCHLORIDE 5 ML: 7.5 INJECTION, SOLUTION EPIDURAL; RETROBULBAR at 09:30

## 2023-08-03 NOTE — PROGRESS NOTES
Assessment/Plan:  1. Trochanteric bursitis of left hip  Ambulatory Referral to Orthopedic Surgery    Ambulatory Referral to Physical Therapy      2. Left hip pain  Ambulatory Referral to Orthopedic Surgery    Ambulatory Referral to Physical Therapy      3. Primary osteoarthritis of one hip, left        4. Lesion of right femur  Ambulatory Referral to Orthopedic Surgery        Suma Abarca is a 44-year-old male, here today for follow-up of his left hip/thigh pain. Patient reports a work-related injury back in January of this year, where he was thrown off his work vehicle. He has undergone a Toradol injection by his PCP back in March, which did not provide him with any relief. He has also undergone a intra-articular corticosteroid injection into his left hip by Dr. Devika Castaneda on April 5. My patient experienced about 3 months of relief following this an intra-articular injection. However, the patient reports that the pain returned, and his symptoms are mostly at the distal anterior portion of his left thigh. He was seen by our clinic in July, and an MRI was ordered. MRI results demonstrated insertional gluteus minimus tendinosis as well as left greater trochanteric bursitis. Patient is here today for further evaluation and to discuss management options for his persistent left hip/thigh pain. Patient underwent a left greater trochanteric bursa corticosteroid injection in clinic today. An ambulatory referral for PT was provided to the patient, and a referral to Dr. Siri Bird has been placed for further evaluation of the right bone lesion in his femur in the setting of extensive smoking history and multiple myeloma in his mother. Large joint arthrocentesis: L greater trochanteric bursa  Universal Protocol:  Consent: Verbal consent obtained.   Risks and benefits: risks, benefits and alternatives were discussed  Consent given by: patient  Patient understanding: patient states understanding of the procedure being performed  Site marked: the operative site was marked  Patient identity confirmed: verbally with patient    Supporting Documentation  Indications: pain   Procedure Details  Location: hip - L greater trochanteric bursa  Preparation: Patient was prepped and draped in the usual sterile fashion  Needle size: 18 G  Ultrasound guidance: no  Approach: lateral  Medications administered: 5 mL bupivacaine (PF) 0.75 % (120 Kenalog)    Patient tolerance: patient tolerated the procedure well with no immediate complications  Dressing:  Sterile dressing applied            Subjective: Overall, patient reports that he is having persistent left hip/thigh pain that is not improving. Patient is frustrated with perceived lack of improvement. He wants to know what can be done definitively for his ongoing pain. He discussed with our office the possibility of a left greater trochanteric bursitis injection today. He is willing to undergo this injection, although he wants to know if this will definitively treat all of his pain. .    Patient ID: Karolina Huang is a 60 y.o. male with persistent left hip/thigh pain since January of this year. Patient reports that this is a work-related incident, after patient fell from his vehicle in January. Patient has tried physical therapy for his back, with little to no relief of symptoms. He recently underwent an intra-articular steroid injection in April by Dr. Freda Rivas, with significant relief of symptoms for about 3 months. His symptoms have returned, and he has underwent a recent MRI, demonstrating a cyst in his right femur, although his right femur is nonpainful. Patient also has evidence of left-sided gluteus minimus tendinosis and greater trochanteric bursitis on the left side. Of note, patient states that he was an active smoker until about 7 years ago, and does have a history of multiple myeloma and osteoporosis in his mother.   Patient is here today for management options as well as greater trochanteric corticosteroid injection. Review of Systems      Past Medical History:   Diagnosis Date   • Diabetes mellitus ([address])    • Hyperlipidemia        Past Surgical History:   Procedure Laterality Date   • HERNIA REPAIR Left 2021    Procedure: REPAIR HERNIA INGUINAL, umbilical hernia repair;  Surgeon: Mercedes Mcnair MD;  Location: [facility];  Service: General   • PALATE / Umm Edman BIOPSY / EXCISION     • MO ARTHROCENTESIS ASPIR&/INJ MAJOR JT/BURSA W/O US Left 2023    Procedure: intra articular hip joint injection ( 51920); Surgeon: Amy Redding DO;  Location: [facility] MAIN OR;  Service: Pain Management    • TONSILLECTOMY AND ADENOIDECTOMY     • VASECTOMY         Family History   Problem Relation Age of Onset   • Diabetes Father        Social History     Occupational History   • Not on file   Tobacco Use   • Smoking status: Former     Packs/day: 1.50     Years: 40.00     Total pack years: 60.00     Types: Cigarettes     Quit date: 3/28/2017     Years since quittin.3   • Smokeless tobacco: Never   Vaping Use   • Vaping Use: Never used   Substance and Sexual Activity   • Alcohol use: Not Currently     Comment: rare   • Drug use: No   • Sexual activity: Yes     Partners: Female     Birth control/protection: None         Current Outpatient Medications:   •  metFORMIN (GLUCOPHAGE) 500 mg tablet, TAKE 2 TABLETS BY MOUTH TWICE A DAY WITH FOOD, Disp: 360 tablet, Rfl: 0  •  rosuvastatin (CRESTOR) 10 MG tablet, TAKE 1 TABLET BY MOUTH EVERY DAY, Disp: 90 tablet, Rfl: 1    Allergies   Allergen Reactions   • Indocin [Indomethacin] Vomiting       Objective:  Vitals:    23 0927   BP: 130/80   Pulse: 85       Body mass index is 32.98 kg/m². Right Hip Exam   Right hip exam is normal.       Left Hip Exam     Tenderness   The patient is experiencing tenderness in the greater trochanter (Tender to palpation distal anterior thigh).     Range of Motion   Abduction: 30   Adduction: 20   Extension: 0 Flexion: 120   External rotation: 40   Internal rotation: 15     Muscle Strength   The patient has normal left hip strength. Tests   WILLIAM: negative    Other   Erythema: absent  Scars: absent  Sensation: normal  Pulse: present            I have personally reviewed pertinent films in PACS. MRI bilateral hips 7/22/23 demonstrate 2.8 cm well-defined cyst in the right femur, as well as insertional tendinosis and greater trochanteric bursitis on the left side. This document was created using speech voice recognition software. Grammatical errors, random word insertions, pronoun errors, and incomplete sentences are an occasional consequence of this system due to software limitations, ambient noise, and hardware issues. Any formal questions or concerns about content, text, or information contained within the body of this dictation should be directly addressed to the provider for clarification.

## 2023-08-04 RX ORDER — BUPIVACAINE HYDROCHLORIDE 7.5 MG/ML
5 INJECTION, SOLUTION EPIDURAL; RETROBULBAR
Status: COMPLETED | OUTPATIENT
Start: 2023-08-03 | End: 2023-08-03

## 2023-08-15 ENCOUNTER — TELEPHONE (OUTPATIENT)
Age: 58
End: 2023-08-15

## 2023-08-15 NOTE — TELEPHONE ENCOUNTER
Caller: Patient     Doctor: Ann Fontaine     Reason for call: Wanted to let provider know that he did not get any relief from the injections. He still cant bend leg to put shoes and socks on.  He states it caused more pain     Call back#: [phone]

## 2023-08-17 NOTE — TELEPHONE ENCOUNTER
Sw pt he will scheduled after his apt with ,    He will give his wife a call and give me a call back in regards to if he will see dilan after

## 2023-08-24 ENCOUNTER — TELEPHONE (OUTPATIENT)
Age: 58
End: 2023-08-24

## 2023-08-24 ENCOUNTER — OFFICE VISIT (OUTPATIENT)
Dept: OBGYN CLINIC | Facility: MEDICAL CENTER | Age: 58
End: 2023-08-24
Payer: COMMERCIAL

## 2023-08-24 VITALS
DIASTOLIC BLOOD PRESSURE: 80 MMHG | SYSTOLIC BLOOD PRESSURE: 135 MMHG | HEART RATE: 82 BPM | HEIGHT: 73 IN | BODY MASS INDEX: 32.68 KG/M2 | WEIGHT: 246.6 LBS

## 2023-08-24 DIAGNOSIS — M89.9 LESION OF RIGHT FEMUR: ICD-10-CM

## 2023-08-24 DIAGNOSIS — M70.62 TROCHANTERIC BURSITIS OF LEFT HIP: ICD-10-CM

## 2023-08-24 DIAGNOSIS — M25.552 LEFT HIP PAIN: ICD-10-CM

## 2023-08-24 PROCEDURE — 99214 OFFICE O/P EST MOD 30 MIN: CPT | Performed by: STUDENT IN AN ORGANIZED HEALTH CARE EDUCATION/TRAINING PROGRAM

## 2023-08-24 NOTE — TELEPHONE ENCOUNTER
Caller: [facility]     Doctor: Noble Gasca    Reason for call: Need the order for Mri w/& W/O contrast faxed over    Fax # [phone]    Call back#: [phone]

## 2023-08-25 NOTE — PROGRESS NOTES
Orthopedic Surgery Office Note  Justice Terrell (49 y.o. male)  : 1965 Encounter Date: 2023  Dr. Jyoti Keys, , Orthopedic Surgeon  [facility] Oncology [facility]   Phone:[phone] FHD:[phone]    Assessment and Plan: Justice Terrell is a 60 y.o. male with:    1. Right femoral neck lesion  - MRI right hip w wo to evaluate nature of lesion with contrast     2. Comorbidity, including: T2DM, former tobacco use, HLD  - continue current management     Procedure:  No procedures performed    Surgical Planning:   No surgery planned at this time    Follow up: No follow-ups on file.   __________________________________________________________________    History of Present Illness: . Justice Terrell is a 60 y.o. male who presents in consultation by Dr. Cathleen Keyes, referred by Nkechi Trevino for right femoral neck cyst/lesion found incidentally on MRI of left hip. He has arthritis on the left and gluteal insertion tendinosis with minimal relief with injections of both    At baseline patient gaits without assistance. No noted constitutional symptoms such as fever, chills, night sweats, fatigue, weight gains/losses. No noted  chest pain/shortness of breath. Patient has no noted  personal history of cancer. Review of Systems:   Allergies, medications, past medical/surgical/family/social history have been reviewed. Complete 12 system review performed and found to be negative except: except as per mentioned in HPI. Physical Examination:   Height: 6' 1" (185.4 cm)  Weight - Scale: 112 kg (246 lb 9.6 oz)  BMI (Calculated): 32.5  BSA (Calculated - m2): 2.35 sq meters     Vitals:    23 1038   BP: 135/80   Pulse: 82     Body mass index is 32.53 kg/m². General: alert and oriented; well nourished/well developed; no apparent distress. Psychiatric: normal mood and affect  HEENT: NCAT. Head/neck - full range of motion. Lungs: breathing comfortably; equal symmetric chest expansion. Abdomen: soft, non-tender, non-distended. Skin: warm; dry; no lesions, rashes, petechiae or purpura; no clubbing, no cyanosis, no edema, no palpable masses. Extremity: Right hip   Inspection: no edema, skin abnormalities throughout   Palpation: no palpable masses or lesions   Range of motion of joints: WFL range of motion all extremities. Motor strength: WNL all extremities. Dorsal/Plantar flexion: intact. Sensation: grossly intact to all extremities. Pulses: intact distally   Gait: normal gait. TTP over left hip, difficulty with WILLIAM on left side    IMAGING RESULTS, All images personally review today by Dr. Lelya Gardner  Study: right hip MRI  Date: 7/22/23  Impression: I have personally reviewed all relevant imaging. No radiologist report was available at this time. My impression is as follows:     Right femoral neck cystic-appearing fluid-sensitive structure   MRI bilateral hips 7/22/23 demonstrate 2.8 cm well-defined cyst in the right femur, as well as insertional tendinosis and greater trochanteric bursitis on the left side. Pathology:   N/A    Review of referring provider's records:  Referring provider: Dr. Eros Giles   Date: 8/3/23  Impression: For his gluteus medius and greater toe bursitis of his left hip he underwent a trochanteric bursa CSI. The risk benefits were discussed and the patient tolerated procedure well. Discussed postinjection instructions. We reviewed MRI and I discussed with him that he is without significant intra-articular pathology we will continue to focus on the soft tissue girdle around his left hip. Regarding the bone cyst in his right femoral neck I discussed with the patient today that his findings on MRI suggest that it is likely a benign finding however I would like him to see our orthopedic oncologist Dr. Leyla Gardner for a definitive opinion. A referral was placed for this today.   I will see him back in approximately 3 to 5 months for repeat evaluation of his left hip and efficacy of the injection provided today. Patient Care team:   Patient Care Team:  Rebecca de leon PCP - General (Family Medicine)     Past Medical History:   Diagnosis Date   • Diabetes mellitus [facility])    • Hyperlipidemia      Past Surgical History:   Procedure Laterality Date   • HERNIA REPAIR Left 2021    Procedure: REPAIR HERNIA INGUINAL, umbilical hernia repair;  Surgeon: Lexii Moss MD;  Location: [facility];  Service: General   • PALATE / [place] / EXCISION     • [place] ARTHROCENTESIS ASPIR&/INJ MAJOR JT/BURSA W/O US Left 2023    Procedure: intra articular hip joint injection ( 54694);   Surgeon: Zoraida Ramírez DO;  Location: [facility] MAIN OR;  Service: Pain Management    • TONSILLECTOMY AND ADENOIDECTOMY     • VASECTOMY         Current Outpatient Medications:   •  metFORMIN (GLUCOPHAGE) 500 mg tablet, TAKE 2 TABLETS BY MOUTH TWICE A DAY WITH FOOD, Disp: 360 tablet, Rfl: 0  •  rosuvastatin (CRESTOR) 10 MG tablet, TAKE 1 TABLET BY MOUTH EVERY DAY, Disp: 90 tablet, Rfl: 1  Allergies   Allergen Reactions   • Indocin [Indomethacin] Vomiting     Family History   Problem Relation Age of Onset   • Diabetes Father      Social History     Socioeconomic History   • Marital status: /Civil Union     Spouse name: Not on file   • Number of children: Not on file   • Years of education: Not on file   • Highest education level: Not on file   Occupational History   • Not on file   Tobacco Use   • Smoking status: Former     Packs/day: 1.50     Years: 40.00     Total pack years: 60.00     Types: Cigarettes     Quit date: 3/28/2017     Years since quittin.4   • Smokeless tobacco: Never   Vaping Use   • Vaping Use: Never used   Substance and Sexual Activity   • Alcohol use: Not Currently     Comment: rare   • Drug use: No   • Sexual activity: Yes     Partners: Female     Birth control/protection: None   Other Topics Concern   • Not on file   Social History Narrative   • Not on file     Social Determinants of Health     Financial Resource Strain: Not on file   Food Insecurity: Not on file   Transportation Needs: Not on file   Physical Activity: Not on file   Stress: Not on file   Social Connections: Not on file   Intimate Partner Violence: Not on file   Housing Stability: Not on file       35 minutes was spent in the coordination of care, reviewing of imaging and with the patient on the date of service    IEver PA-C, scribed for Dr. Zak Osorio, who performed parts of the services as described in this documentation      Ever Lucia PA-C   8/25/2023 5:25 PM     Problem List Items Addressed This Visit    None  Visit Diagnoses     Left hip pain        Trochanteric bursitis of left hip        Lesion of right femur        Relevant Orders    MRI hip right w wo contrast

## 2023-09-15 ENCOUNTER — TELEPHONE (OUTPATIENT)
Age: 58
End: 2023-09-15

## 2023-09-15 NOTE — TELEPHONE ENCOUNTER
Caller: patient    Doctor: Kedric Siemens    Reason for call: pt called stating he dropped off his MRI. It is scanned into his chart.   The pt is asking what the next step is    Call back#: [phone]

## 2023-09-19 DIAGNOSIS — D48.0 NEOPLASM OF UNCERTAIN BEHAVIOR OF BONE AND ARTICULAR CARTILAGE: Primary | ICD-10-CM

## 2023-09-20 NOTE — TELEPHONE ENCOUNTER
Caller: patient    Doctor: Leyla Gardner    Reason for call: patient informed of bone scan order    Call back#: n/a

## 2023-09-20 NOTE — TELEPHONE ENCOUNTER
Caller: Patient    Doctor: Eric Ch    Reason for call: Can we mail a copy of the Rx for the whole body bone scan to the patient.     Call back#: [phone]

## 2023-09-22 NOTE — TELEPHONE ENCOUNTER
Caller: Self    Doctor: Wade Silva    Reason for call: Patient would like to speak with doctor regarding why he is ordering a full body scan. He only wants to speak with Dr Wade Silva regarding this.     Call back#: [phone]

## 2023-09-25 ENCOUNTER — TELEPHONE (OUTPATIENT)
Dept: OBGYN CLINIC | Facility: HOSPITAL | Age: 58
End: 2023-09-25

## 2023-09-25 NOTE — TELEPHONE ENCOUNTER
Caller: Florecita Sender (wife)    Doctor: Jazmine Merino / NANDA     Reason for call: Patient's wife called in to confirm MRI CD from [facility] was received at the [facility] for Dr. Jazmine Merino to review. Patient and wife are very concerned this is something suspicious .       Please call to advise     Call back#: [phone]

## 2023-09-26 NOTE — TELEPHONE ENCOUNTER
Caller: patient   Doctor: Kedric Siemens     Reason for call: patient would like a cb from Dr Kedric Siemens. Asking why he cant see the MRI image.     Call back#: [phone]

## 2023-09-26 NOTE — TELEPHONE ENCOUNTER
Caller: Carmen Metzger [facility]    Doctor: Ann Kirk    Reason for call: Codi Coburn called in asking if there is someone they would be able to give remote access to to be able to review the images.  Call was disconnected while placed on hold    Call back#:

## 2023-09-26 NOTE — TELEPHONE ENCOUNTER
Caller: Patient     Doctor: Wade Silva    Reason for call:     Patient is calling to let the Dr know that both MRI images were not his. It was someone elses. He is quite upset and would like a call from the Dr only. He is not doing anything until he speaks to the Doctor. ..     Call back#: [phone]

## 2023-09-27 NOTE — TELEPHONE ENCOUNTER
Caller: Shaina Guerrero  Doctor: Leonard Castano    Reason for call: patient wants Dr Leonard Castano to view the MRI, Shaina Guerrero will fax instructions on how to log in to system to view to [facility] office.      Call back#:

## 2023-09-28 NOTE — TELEPHONE ENCOUNTER
[facility]er: [facility]    Doctor: Tremaine Luis    Reason for call: [facility] is calling to check on the status of this. She stated she was supposed to receive a callback to confirm we received instructions on how to view the images.     Call back#:[phone] ext [phone]

## 2023-10-05 NOTE — TELEPHONE ENCOUNTER
Caller: Patient- Marquez Young    Doctor: Dr Elva Garnett    Reason for call: Patient is calling in stating that he is very upset that he keeps getting the run around and the imaging center has given our office access to be able to review the MRI images and still no one has contacted him relating his MRI results. The patient is asking for the Dr to contact him directly as he is not getting any answers, please reach out to patient as soon as possible.      Call back#: [phone]

## 2023-10-07 LAB
ALBUMIN SERPL-MCNC: 4.5 G/DL (ref 3.8–4.9)
ALBUMIN/GLOB SERPL: 2.1 {RATIO} (ref 1.2–2.2)
ALP SERPL-CCNC: 91 IU/L (ref 44–121)
ALT SERPL-CCNC: 29 IU/L (ref 0–44)
AST SERPL-CCNC: 15 IU/L (ref 0–40)
BASOPHILS # BLD AUTO: 0.1 X10E3/UL (ref 0–0.2)
BASOPHILS NFR BLD AUTO: 1 %
BILIRUB SERPL-MCNC: 0.5 MG/DL (ref 0–1.2)
BUN SERPL-MCNC: 15 MG/DL (ref 6–24)
BUN/CREAT SERPL: 25 (ref 9–20)
CALCIUM SERPL-MCNC: 9.7 MG/DL (ref 8.7–10.2)
CHLORIDE SERPL-SCNC: 99 MMOL/L (ref 96–106)
CHOLEST SERPL-MCNC: 123 MG/DL (ref 100–199)
CHOLEST/HDLC SERPL: 3.3 RATIO (ref 0–5)
CO2 SERPL-SCNC: 24 MMOL/L (ref 20–29)
CREAT SERPL-MCNC: 0.61 MG/DL (ref 0.76–1.27)
EGFR: 111 ML/MIN/1.73
EOSINOPHIL # BLD AUTO: 0.2 X10E3/UL (ref 0–0.4)
EOSINOPHIL NFR BLD AUTO: 2 %
ERYTHROCYTE [DISTWIDTH] IN BLOOD BY AUTOMATED COUNT: 13.1 % (ref 11.6–15.4)
EST. AVERAGE GLUCOSE BLD GHB EST-MCNC: 200 MG/DL
GLOBULIN SER-MCNC: 2.1 G/DL (ref 1.5–4.5)
GLUCOSE SERPL-MCNC: 210 MG/DL (ref 70–99)
HBA1C MFR BLD: 8.6 % (ref 4.8–5.6)
HCT VFR BLD AUTO: 43.9 % (ref 37.5–51)
HDLC SERPL-MCNC: 37 MG/DL
HGB BLD-MCNC: 14.8 G/DL (ref 13–17.7)
IMM GRANULOCYTES # BLD: 0 X10E3/UL (ref 0–0.1)
IMM GRANULOCYTES NFR BLD: 0 %
LDLC SERPL CALC-MCNC: 64 MG/DL (ref 0–99)
LYMPHOCYTES # BLD AUTO: 1.8 X10E3/UL (ref 0.7–3.1)
LYMPHOCYTES NFR BLD AUTO: 18 %
MCH RBC QN AUTO: 29.9 PG (ref 26.6–33)
MCHC RBC AUTO-ENTMCNC: 33.7 G/DL (ref 31.5–35.7)
MCV RBC AUTO: 89 FL (ref 79–97)
MICRODELETION SYND BLD/T FISH: NORMAL
MONOCYTES # BLD AUTO: 0.7 X10E3/UL (ref 0.1–0.9)
MONOCYTES NFR BLD AUTO: 7 %
NEUTROPHILS # BLD AUTO: 7.2 X10E3/UL (ref 1.4–7)
NEUTROPHILS NFR BLD AUTO: 72 %
PLATELET # BLD AUTO: 222 X10E3/UL (ref 150–450)
POTASSIUM SERPL-SCNC: 4.2 MMOL/L (ref 3.5–5.2)
PROT SERPL-MCNC: 6.6 G/DL (ref 6–8.5)
RBC # BLD AUTO: 4.95 X10E6/UL (ref 4.14–5.8)
SL AMB VLDL CHOLESTEROL CALC: 22 MG/DL (ref 5–40)
SODIUM SERPL-SCNC: 138 MMOL/L (ref 134–144)
TRIGL SERPL-MCNC: 119 MG/DL (ref 0–149)
WBC # BLD AUTO: 10 X10E3/UL (ref 3.4–10.8)

## 2023-10-11 ENCOUNTER — OFFICE VISIT (OUTPATIENT)
Dept: FAMILY MEDICINE CLINIC | Facility: CLINIC | Age: 58
End: 2023-10-11
Payer: COMMERCIAL

## 2023-10-11 VITALS
DIASTOLIC BLOOD PRESSURE: 74 MMHG | SYSTOLIC BLOOD PRESSURE: 132 MMHG | BODY MASS INDEX: 33.21 KG/M2 | RESPIRATION RATE: 18 BRPM | HEIGHT: 73 IN | HEART RATE: 72 BPM | TEMPERATURE: 97.8 F | WEIGHT: 250.6 LBS

## 2023-10-11 DIAGNOSIS — Z87.891 FORMER TOBACCO USE: ICD-10-CM

## 2023-10-11 DIAGNOSIS — E66.9 OBESITY (BMI 30-39.9): ICD-10-CM

## 2023-10-11 DIAGNOSIS — E11.9 TYPE 2 DIABETES MELLITUS WITHOUT COMPLICATION, WITHOUT LONG-TERM CURRENT USE OF INSULIN (HCC): Primary | ICD-10-CM

## 2023-10-11 DIAGNOSIS — E78.2 MIXED HYPERLIPIDEMIA: ICD-10-CM

## 2023-10-11 LAB
LEFT EYE DIABETIC RETINOPATHY: NORMAL
LEFT EYE IMAGE QUALITY: NORMAL
LEFT EYE MACULAR EDEMA: NORMAL
LEFT EYE OTHER RETINOPATHY: NORMAL
RIGHT EYE DIABETIC RETINOPATHY: NORMAL
RIGHT EYE IMAGE QUALITY: NORMAL
RIGHT EYE MACULAR EDEMA: NORMAL
RIGHT EYE OTHER RETINOPATHY: NORMAL
SEVERITY (EYE EXAM): NORMAL

## 2023-10-11 PROCEDURE — 92250 FUNDUS PHOTOGRAPHY W/I&R: CPT | Performed by: NURSE PRACTITIONER

## 2023-10-11 PROCEDURE — 99214 OFFICE O/P EST MOD 30 MIN: CPT | Performed by: NURSE PRACTITIONER

## 2023-10-11 NOTE — PROGRESS NOTES
Assessment/Plan:    1. Type 2 diabetes mellitus without complication, without long-term current use of insulin ([address])  Assessment & Plan:  Working on diet changes and has lost 10 pounds. Will have him repeat labs in 2 months prior to follow up  Lab Results   Component Value Date    HGBA1C 8.6 (H) 10/06/2023       Orders:  -     IR[facility] Diabetic eye exam  -     Hemoglobin A1C; Future; Expected date: 12/11/2023  -     Lipid panel; Future; Expected date: 12/11/2023  -     Comprehensive metabolic panel; Future; Expected date: 12/11/2023  -     Hemoglobin A1C  -     Lipid panel  -     Comprehensive metabolic panel    2. Mixed hyperlipidemia  Assessment & Plan:  LDL at goal, continue statin    Orders:  -     Hemoglobin A1C; Future; Expected date: 12/11/2023  -     Lipid panel; Future; Expected date: 12/11/2023  -     Comprehensive metabolic panel; Future; Expected date: 12/11/2023  -     Hemoglobin A1C  -     Lipid panel  -     Comprehensive metabolic panel    3. Obesity (BMI 30-39. 9)  Assessment & Plan:  Continue weight loss efforts       4. Former tobacco use  Assessment & Plan:  CT lung screen ordered     Orders:  -     CT lung screening program; Future; Expected date: 10/11/2023        Depression Screening and Follow-up Plan: Patient was screened for depression during today's encounter. They screened negative with a PHQ-2 score of 0. Patient Instructions   Dexcom or Freestyle [place] continuous     Return in about 2 months (around 12/11/2023) for diabetes follow up labs prior. Subjective:      Patient ID: Yordy Borrero is a 60 y.o. male. Chief Complaint   Patient presents with   • Follow-up     B[place] CMA        Here today for follow up. His wife does the cooking and he has been eating a lot of pasta and carbohydrates. He is working on making changes.   Has lost 10 pounds          The following portions of the patient's history were reviewed and updated as appropriate: allergies, current medications, past family history, past medical history, past social history, past surgical history and problem list.    Review of Systems   Constitutional:  Negative for chills, fatigue and fever. Respiratory:  Negative for cough, shortness of breath and wheezing. Cardiovascular:  Negative for chest pain, palpitations and leg swelling. Gastrointestinal:  Negative for abdominal pain, diarrhea, nausea and vomiting. Skin:  Negative for rash. Neurological:  Negative for dizziness and headaches. Current Outpatient Medications   Medication Sig Dispense Refill   • metFORMIN (GLUCOPHAGE) 500 mg tablet TAKE 2 TABLETS BY MOUTH TWICE A DAY WITH FOOD 360 tablet 0   • rosuvastatin (CRESTOR) 10 MG tablet TAKE 1 TABLET BY MOUTH EVERY DAY 90 tablet 1     No current facility-administered medications for this visit. Objective:    /74   Pulse 72   Temp 97.8 °F (36.6 °C)   Resp 18   Ht 6' 1" (1.854 m)   Wt 114 kg (250 lb 9.6 oz)   BMI 33.06 kg/m²        Physical Exam  Vitals and nursing note reviewed. Constitutional:       Appearance: Normal appearance. He is well-developed. Neck:      Vascular: No carotid bruit. Cardiovascular:      Rate and Rhythm: Normal rate and regular rhythm. Pulses: Normal pulses. no weak pulses     Heart sounds: Normal heart sounds. No murmur heard. Pulmonary:      Effort: Pulmonary effort is normal.      Breath sounds: Normal breath sounds. Feet:      Right foot:      Skin integrity: Callus present. No ulcer, skin breakdown, erythema, warmth or dry skin. Left foot:      Skin integrity: Callus present. No ulcer, skin breakdown, erythema, warmth or dry skin. Skin:     General: Skin is warm and dry. Neurological:      Mental Status: He is alert. Psychiatric:         Mood and Affect: Mood normal.         Behavior: Behavior normal.         Diabetic Foot Exam    Patient's shoes and socks removed.     Right Foot/Ankle   Right Foot Inspection  Skin Exam: skin normal, skin intact, callus and callus. No dry skin, no warmth, no erythema, no maceration, no abnormal color, no pre-ulcer and no ulcer. Toe Exam: ROM and strength within normal limits. Sensory   Vibration: intact  Proprioception: intact  Monofilament testing: diminished    Vascular  Capillary refills: < 3 seconds      Left Foot/Ankle  Left Foot Inspection  Skin Exam: skin normal, skin intact and callus. No dry skin, no warmth, no erythema, no maceration, normal color, no pre-ulcer and no ulcer. Toe Exam: ROM and strength within normal limits.      Sensory   Vibration: intact  Proprioception: intact  Monofilament testing: diminished    Vascular  Capillary refills: < 3 seconds      Assign Risk Category  No deformity present  Loss of protective sensation  No weak pulses  Risk: 1         IRMA Bustillo

## 2023-10-11 NOTE — ASSESSMENT & PLAN NOTE
Working on diet changes and has lost 10 pounds.   Will have him repeat labs in 2 months prior to follow up  Lab Results   Component Value Date    HGBA1C 8.6 (H) 10/06/2023

## 2023-10-17 ENCOUNTER — PATIENT MESSAGE (OUTPATIENT)
Dept: FAMILY MEDICINE CLINIC | Facility: CLINIC | Age: 58
End: 2023-10-17

## 2023-10-17 DIAGNOSIS — J18.9 PNEUMONIA OF LEFT LOWER LOBE DUE TO INFECTIOUS ORGANISM: Primary | ICD-10-CM

## 2023-10-18 RX ORDER — DOXYCYCLINE HYCLATE 100 MG/1
100 CAPSULE ORAL EVERY 12 HOURS SCHEDULED
Qty: 20 CAPSULE | Refills: 0 | Status: SHIPPED | OUTPATIENT
Start: 2023-10-18 | End: 2023-10-28

## 2023-10-18 NOTE — PATIENT COMMUNICATION
Spoke w/ pt regarding results. He had a recent URI, but denies any lingering cough or chest congestion, breathing difficulties. Will start on Doxycycline for LLL pneumonia and repeat chest CT in 3 months. Orders placed, to call with any concerns or issues.  Antonella Thomas

## 2023-12-17 LAB
ALBUMIN SERPL-MCNC: 4.7 G/DL (ref 3.8–4.9)
ALBUMIN/GLOB SERPL: 2.4 {RATIO} (ref 1.2–2.2)
ALP SERPL-CCNC: 84 IU/L (ref 44–121)
ALT SERPL-CCNC: 51 IU/L (ref 0–44)
AST SERPL-CCNC: 28 IU/L (ref 0–40)
BILIRUB SERPL-MCNC: 0.3 MG/DL (ref 0–1.2)
BUN SERPL-MCNC: 20 MG/DL (ref 6–24)
BUN/CREAT SERPL: 28 (ref 9–20)
CALCIUM SERPL-MCNC: 9.6 MG/DL (ref 8.7–10.2)
CHLORIDE SERPL-SCNC: 101 MMOL/L (ref 96–106)
CHOLEST SERPL-MCNC: 128 MG/DL (ref 100–199)
CHOLEST/HDLC SERPL: 3.8 RATIO (ref 0–5)
CO2 SERPL-SCNC: 20 MMOL/L (ref 20–29)
CREAT SERPL-MCNC: 0.71 MG/DL (ref 0.76–1.27)
EGFR: 106 ML/MIN/1.73
EST. AVERAGE GLUCOSE BLD GHB EST-MCNC: 189 MG/DL
GLOBULIN SER-MCNC: 2 G/DL (ref 1.5–4.5)
GLUCOSE SERPL-MCNC: 220 MG/DL (ref 70–99)
HBA1C MFR BLD: 8.2 % (ref 4.8–5.6)
HDLC SERPL-MCNC: 34 MG/DL
LDLC SERPL CALC-MCNC: 64 MG/DL (ref 0–99)
MICRODELETION SYND BLD/T FISH: NORMAL
POTASSIUM SERPL-SCNC: 4.3 MMOL/L (ref 3.5–5.2)
PROT SERPL-MCNC: 6.7 G/DL (ref 6–8.5)
SL AMB VLDL CHOLESTEROL CALC: 30 MG/DL (ref 5–40)
SODIUM SERPL-SCNC: 138 MMOL/L (ref 134–144)
TRIGL SERPL-MCNC: 180 MG/DL (ref 0–149)

## 2023-12-18 ENCOUNTER — OFFICE VISIT (OUTPATIENT)
Dept: FAMILY MEDICINE CLINIC | Facility: CLINIC | Age: 58
End: 2023-12-18
Payer: COMMERCIAL

## 2023-12-18 VITALS
TEMPERATURE: 97.3 F | HEART RATE: 99 BPM | BODY MASS INDEX: 34.51 KG/M2 | DIASTOLIC BLOOD PRESSURE: 68 MMHG | RESPIRATION RATE: 18 BRPM | SYSTOLIC BLOOD PRESSURE: 132 MMHG | WEIGHT: 261.6 LBS

## 2023-12-18 DIAGNOSIS — E11.9 TYPE 2 DIABETES MELLITUS WITHOUT COMPLICATION, WITHOUT LONG-TERM CURRENT USE OF INSULIN (HCC): Primary | ICD-10-CM

## 2023-12-18 DIAGNOSIS — Z12.5 SCREENING PSA (PROSTATE SPECIFIC ANTIGEN): ICD-10-CM

## 2023-12-18 DIAGNOSIS — E78.2 MIXED HYPERLIPIDEMIA: ICD-10-CM

## 2023-12-18 DIAGNOSIS — Z13.6 SCREENING FOR CARDIOVASCULAR CONDITION: ICD-10-CM

## 2023-12-18 PROCEDURE — 99213 OFFICE O/P EST LOW 20 MIN: CPT | Performed by: NURSE PRACTITIONER

## 2023-12-18 NOTE — PROGRESS NOTES
Assessment/Plan:    1. Type 2 diabetes mellitus without complication, without long-term current use of insulin (HCC)  Assessment & Plan:  He wants to continue working on diet changes.  Will continue Metformin and repeat labs in 3 months    Lab Results   Component Value Date    HGBA1C 8.2 (H) 12/16/2023     Orders:  -     Hemoglobin A1C; Future; Expected date: 03/18/2024  -     Albumin / creatinine urine ratio; Future; Expected date: 03/18/2024  -     Hemoglobin A1C  -     Albumin / creatinine urine ratio    2. Mixed hyperlipidemia  Assessment & Plan:  Continue statin     Orders:  -     Hemoglobin A1C; Future; Expected date: 03/18/2024  -     Albumin / creatinine urine ratio; Future; Expected date: 03/18/2024  -     Hemoglobin A1C  -     Albumin / creatinine urine ratio    3. Screening for cardiovascular condition  -     Comprehensive metabolic panel; Future; Expected date: 03/18/2024  -     Comprehensive metabolic panel    4. Screening PSA (prostate specific antigen)  -     PSA Total (Reflex To Free); Future; Expected date: 03/18/2024  -     PSA Total (Reflex To Free)            Patient Instructions   Schedule follow up CT chest  Repeat labs in 3 months prior to follow up     Return in about 3 months (around 3/18/2024).    Subjective:      Patient ID: Anshul Fournier is a 58 y.o. male.    Chief Complaint   Patient presents with   • Follow-up     2 month f/u   SHELDON HUBER       Here today for follow up.  Working on diet changes, however wife is still not supportive of diet changes.  Saw orthopedic oncologist through [facility], was felt right leg finding is a simple bone cyst.  He has follow up scheduled.   Has gained 10 pounds since last visit.  Joined the gym.        The following portions of the patient's history were reviewed and updated as appropriate: allergies, current medications, past family history, past medical history, past social history, past surgical history and problem list.    Review of Systems    Constitutional: Negative.    Respiratory: Negative.     Cardiovascular: Negative.    Gastrointestinal: Negative.    Neurological: Negative.          Current Outpatient Medications   Medication Sig Dispense Refill   • metFORMIN (GLUCOPHAGE) 500 mg tablet TAKE 2 TABLETS BY MOUTH TWICE A DAY WITH FOOD 360 tablet 0   • rosuvastatin (CRESTOR) 10 MG tablet TAKE 1 TABLET BY MOUTH EVERY DAY 90 tablet 1     No current facility-administered medications for this visit.       Objective:    /68   Pulse 99   Temp (!) 97.3 °F (36.3 °C)   Resp 18   Wt 119 kg (261 lb 9.6 oz)   BMI 34.51 kg/m²        Physical Exam  Vitals and nursing note reviewed.   Constitutional:       Appearance: Normal appearance. He is well-developed.   Cardiovascular:      Rate and Rhythm: Normal rate and regular rhythm.      Pulses: Normal pulses.      Heart sounds: Normal heart sounds. No murmur heard.  Pulmonary:      Effort: Pulmonary effort is normal.      Breath sounds: Normal breath sounds.   Skin:     General: Skin is warm and dry.   Neurological:      Mental Status: He is alert.   Psychiatric:         Mood and Affect: Mood normal.         Behavior: Behavior normal.                IRMA Bustillo

## 2023-12-18 NOTE — ASSESSMENT & PLAN NOTE
He wants to continue working on diet changes.  Will continue Metformin and repeat labs in 3 months    Lab Results   Component Value Date    HGBA1C 8.2 (H) 12/16/2023      I will START or STAY ON the medications listed below when I get home from the hospital:  None

## 2023-12-22 ENCOUNTER — TELEPHONE (OUTPATIENT)
Dept: FAMILY MEDICINE CLINIC | Facility: CLINIC | Age: 58
End: 2023-12-22

## 2023-12-23 DIAGNOSIS — E78.2 MIXED HYPERLIPIDEMIA: ICD-10-CM

## 2023-12-23 DIAGNOSIS — E11.9 TYPE 2 DIABETES MELLITUS WITHOUT COMPLICATION, WITHOUT LONG-TERM CURRENT USE OF INSULIN (HCC): ICD-10-CM

## 2023-12-26 RX ORDER — ROSUVASTATIN CALCIUM 10 MG/1
TABLET, COATED ORAL
Qty: 90 TABLET | Refills: 1 | Status: SHIPPED | OUTPATIENT
Start: 2023-12-26

## 2024-01-08 ENCOUNTER — TELEPHONE (OUTPATIENT)
Dept: FAMILY MEDICINE CLINIC | Facility: CLINIC | Age: 59
End: 2024-01-08

## 2024-01-08 NOTE — TELEPHONE ENCOUNTER
Pt stated he cannot use [facility].  He does not understand where the issue is at now when his wife has dropped off disc to us in the past. He doesn't understand what the issue is.     He said you will have the report by tomorrow most likely.  Tamera Hernandez  CMA

## 2024-01-08 NOTE — TELEPHONE ENCOUNTER
I can't do anything with the CD, I cannot interpret the images.  Please let pt know, would recommend he hold onto the disc if there is any need for a specialist to review the images in the future.  Can pick it up at his convenience. IRMA Bustillo

## 2024-01-11 ENCOUNTER — TELEPHONE (OUTPATIENT)
Dept: OBGYN CLINIC | Facility: HOSPITAL | Age: 59
End: 2024-01-11

## 2024-01-11 ENCOUNTER — OFFICE VISIT (OUTPATIENT)
Dept: OBGYN CLINIC | Facility: CLINIC | Age: 59
End: 2024-01-11
Payer: COMMERCIAL

## 2024-01-11 VITALS
HEART RATE: 83 BPM | SYSTOLIC BLOOD PRESSURE: 125 MMHG | BODY MASS INDEX: 34.35 KG/M2 | DIASTOLIC BLOOD PRESSURE: 77 MMHG | WEIGHT: 259.2 LBS | HEIGHT: 73 IN

## 2024-01-11 DIAGNOSIS — Z86.39 HISTORY OF DIABETES MELLITUS: ICD-10-CM

## 2024-01-11 DIAGNOSIS — M25.552 LEFT HIP PAIN: ICD-10-CM

## 2024-01-11 DIAGNOSIS — M16.12 PRIMARY OSTEOARTHRITIS OF ONE HIP, LEFT: Primary | ICD-10-CM

## 2024-01-11 PROCEDURE — 99214 OFFICE O/P EST MOD 30 MIN: CPT | Performed by: ORTHOPAEDIC SURGERY

## 2024-01-11 NOTE — PROGRESS NOTES
Assessment/Plan:  1. Primary osteoarthritis of one hip, left        2. Left hip pain        3. History of diabetes mellitus          Scribe Attestation      I,:  Bernabe Aparicio am acting as a scribe while in the presence of the attending physician.:       I,:  Jesus Lynch, DO personally performed the services described in this documentation    as scribed in my presence.:           Anshul as well as a 58-year-old gentleman who returns today for follow-up evaluation of his left hip pain.  We spent time today discussing his response to treatment and I explained that it does appear he is symptomatic of intra-articular pathology in his left hip.  Although the degenerative change in his hip appears mild on imaging, his symptoms are consistent with his underlying osteoarthritis and he has responded to intra-articular injection previously.  We discussed the risks and benefits of repeating this today.  We will help to facilitate a repeat injection to be performed by our spine and pain team.  We also spent time today discussing his A1c and he understands he is currently contraindicated for surgery.  His A1c will have to decrease to 7.5 or below and I did commend him on his efforts to improve his health recently.  We will continue managing his pain with intra-articular injection therapy until this is no longer beneficial.  All of his questions and concerns were addressed today.    Subjective: Follow-up evaluation for left hip pain    Patient ID: Anshul Fournier is a 58 y.o. male who returns today for follow-up evaluation of his left hip pain.  He received a trochanteric bursa injection on 8/3/2023.  He was also referred to physical therapy at that visit.  He was also referred to Dr. Estrella for evaluation a lesion seen in his right femur.  He was subsequently also evaluated for this at [facility].  At today's visit, he reports that he received no relief from the greater trochanteric bursa injection.  He does report  receiving approximately 3 months worth of relief from the intra-articular injection he received with Dr. Vega last April.  He complains of persistent pain about his lateral hip and thigh.  He also reports difficulty dressing, particularly putting on his socks and shoes.  He has been working to improve his health recently and is exercising in the gym a few days per week.  He is also semiretired now.  He denies any new injury or trauma.    Review of Systems   Constitutional:  Positive for activity change. Negative for chills, fever and unexpected weight change.   HENT:  Negative for hearing loss, nosebleeds and sore throat.    Eyes:  Negative for pain, redness and visual disturbance.   Respiratory:  Negative for cough, shortness of breath and wheezing.    Cardiovascular:  Negative for chest pain, palpitations and leg swelling.   Gastrointestinal:  Negative for abdominal pain, nausea and vomiting.   Endocrine: Negative for polyphagia and polyuria.   Genitourinary:  Negative for dysuria and hematuria.   Musculoskeletal:  Positive for myalgias. Negative for arthralgias and joint swelling.        See HPI   Skin:  Negative for rash and wound.   Neurological:  Negative for dizziness, numbness and headaches.   Psychiatric/Behavioral:  Negative for decreased concentration and suicidal ideas. The patient is not nervous/anxious.          Past Medical History:   Diagnosis Date    Diabetes mellitus (HCC)     Hyperlipidemia        Past Surgical History:   Procedure Laterality Date    HERNIA REPAIR Left 4/11/2021    Procedure: REPAIR HERNIA INGUINAL, umbilical hernia repair;  Surgeon: Virgie Rowland MD;  Location: [facility] MAIN OR;  Service: General    PALATE / UVULA BIOPSY / EXCISION      [place] ARTHROCENTESIS ASPIR&/INJ MAJOR JT/BURSA W/O  Left 4/5/2023    Procedure: intra articular hip joint injection ([id number]);  Surgeon: Fernandez Vega DO;  Location: [facility] MAIN OR;  Service: Pain Management     TONSILLECTOMY AND ADENOIDECTOMY       VASECTOMY         Family History   Problem Relation Age of Onset    Diabetes Father        Social History     Occupational History    Not on file   Tobacco Use    Smoking status: Former     Current packs/day: 0.00     Average packs/day: 1.5 packs/day for 40.0 years (60.0 ttl pk-yrs)     Types: Cigarettes     Start date: 3/28/1977     Quit date: 3/28/2017     Years since quittin.7    Smokeless tobacco: Never   Vaping Use    Vaping status: Never Used   Substance and Sexual Activity    Alcohol use: Not Currently     Comment: rare    Drug use: No    Sexual activity: Yes     Partners: Female     Birth control/protection: None         Current Outpatient Medications:     metFORMIN (GLUCOPHAGE) 500 mg tablet, TAKE 2 TABLETS BY MOUTH TWICE A DAY WITH FOOD, Disp: 360 tablet, Rfl: 1    rosuvastatin (CRESTOR) 10 MG tablet, TAKE 1 TABLET BY MOUTH EVERY DAY, Disp: 90 tablet, Rfl: 1    Allergies   Allergen Reactions    Indocin [Indomethacin] Vomiting       Objective:  Vitals:    24 1013   BP: 125/77   Pulse: 83       Body mass index is 34.2 kg/m².    Left Hip Exam     Tenderness   The patient is experiencing tenderness in the greater trochanter.    Range of Motion   Abduction:  30   Adduction:  20   Extension:  0   Flexion:  120   External rotation:  40   Internal rotation: 5     Muscle Strength   The patient has normal left hip strength.     Tests   WILLIAM: positive    Other   Erythema: absent  Scars: absent  Sensation: normal  Pulse: present    Comments:  Tender to palpation distal anterior thigh  FADIR: negative            Physical Exam  Vitals and nursing note reviewed.   Constitutional:       Appearance: Normal appearance. He is well-developed.   HENT:      Head: Normocephalic and atraumatic.      Right Ear: External ear normal.      Left Ear: External ear normal.      Nose: Nose normal.   Eyes:      General: No scleral icterus.     Extraocular Movements: Extraocular movements intact.      Conjunctiva/sclera:  Conjunctivae normal.   Cardiovascular:      Rate and Rhythm: Normal rate.   Pulmonary:      Effort: Pulmonary effort is normal. No respiratory distress.   Musculoskeletal:      Cervical back: Normal range of motion and neck supple.      Comments: See Ortho exam   Skin:     General: Skin is warm and dry.   Neurological:      General: No focal deficit present.      Mental Status: He is alert and oriented to person, place, and time.   Psychiatric:         Behavior: Behavior normal.         This document was created using speech voice recognition software.   Grammatical errors, random word insertions, pronoun errors, and incomplete sentences are an occasional consequence of this system due to software limitations, ambient noise, and hardware issues.   Any formal questions or concerns about content, text, or information contained within the body of this dictation should be directly addressed to the provider for clarification.

## 2024-01-11 NOTE — TELEPHONE ENCOUNTER
Caller: Anshul    Doctor: Rocio    Reason for call: Returning call .nothing documented in chart referring to a call. Patient would like to schedule id US injection  Please advise the patient    Call back#: [phone]

## 2024-01-11 NOTE — TELEPHONE ENCOUNTER
Scheduled patient for right hip injection 02/07/2024  Patient denies RX blood thinners/ NSAIDS  Nothing to eat or drink 1 hour prior to procedure  Needs to arrange transportation  Proper clothing for procedure  No vaccines 2 weeks prior or after procedure  If ill or place on antibiotics, please call to reschedule

## 2024-02-07 ENCOUNTER — APPOINTMENT (OUTPATIENT)
Dept: RADIOLOGY | Facility: HOSPITAL | Age: 59
End: 2024-02-07
Payer: COMMERCIAL

## 2024-02-07 ENCOUNTER — HOSPITAL ENCOUNTER (OUTPATIENT)
Facility: AMBULARY SURGERY CENTER | Age: 59
Setting detail: OUTPATIENT SURGERY
Discharge: HOME | End: 2024-02-07
Attending: PHYSICAL MEDICINE & REHABILITATION | Admitting: PHYSICAL MEDICINE & REHABILITATION
Payer: COMMERCIAL

## 2024-02-07 VITALS
RESPIRATION RATE: 18 BRPM | TEMPERATURE: 96.3 F | SYSTOLIC BLOOD PRESSURE: 153 MMHG | OXYGEN SATURATION: 97 % | DIASTOLIC BLOOD PRESSURE: 88 MMHG | HEART RATE: 74 BPM

## 2024-02-07 PROCEDURE — NC001 PR NO CHARGE: Performed by: PHYSICAL MEDICINE & REHABILITATION

## 2024-02-07 PROCEDURE — 77002 NEEDLE LOCALIZATION BY XRAY: CPT | Performed by: PHYSICAL MEDICINE & REHABILITATION

## 2024-02-07 PROCEDURE — 20610 DRAIN/INJ JOINT/BURSA W/O US: CPT | Performed by: PHYSICAL MEDICINE & REHABILITATION

## 2024-02-07 PROCEDURE — 77002 NEEDLE LOCALIZATION BY XRAY: CPT

## 2024-02-07 RX ORDER — METHYLPREDNISOLONE ACETATE 80 MG/ML
INJECTION, SUSPENSION INTRA-ARTICULAR; INTRALESIONAL; INTRAMUSCULAR; SOFT TISSUE AS NEEDED
Status: DISCONTINUED | OUTPATIENT
Start: 2024-02-07 | End: 2024-02-07 | Stop reason: HOSPADM

## 2024-02-07 RX ORDER — LIDOCAINE HYDROCHLORIDE 10 MG/ML
INJECTION, SOLUTION EPIDURAL; INFILTRATION; INTRACAUDAL; PERINEURAL AS NEEDED
Status: DISCONTINUED | OUTPATIENT
Start: 2024-02-07 | End: 2024-02-07 | Stop reason: HOSPADM

## 2024-02-07 RX ORDER — BUPIVACAINE HYDROCHLORIDE 2.5 MG/ML
INJECTION, SOLUTION EPIDURAL; INFILTRATION; INTRACAUDAL AS NEEDED
Status: DISCONTINUED | OUTPATIENT
Start: 2024-02-07 | End: 2024-02-07 | Stop reason: HOSPADM

## 2024-02-07 NOTE — H&P
History of Present Illness: The patient is a 58 y.o. male who presents with complaints of left hip pain                              Past Medical History:   Diagnosis Date    Diabetes mellitus (HCC)     Hyperlipidemia        Past Surgical History:   Procedure Laterality Date    HERNIA REPAIR Left 4/11/2021    Procedure: REPAIR HERNIA INGUINAL, umbilical hernia repair;  Surgeon: Virgie Rowland MD;  Location: [facility] MAIN OR;  Service: General    PALATE / UVULA BIOPSY / EXCISION      [place] ARTHROCENTESIS ASPIR&/INJ MAJOR JT/BURSA W/O US Left 4/5/2023    Procedure: intra articular hip joint injection ([id number]);  Surgeon: Fernandez Vega DO;  Location: [facility] MAIN OR;  Service: Pain Management     TONSILLECTOMY AND ADENOIDECTOMY      VASECTOMY         No current facility-administered medications for this encounter.    Allergies   Allergen Reactions    Indocin [Indomethacin] Vomiting       Physical Exam:   Vitals:    02/07/24 0945   BP: 135/76   Pulse: 81   Resp: 18   Temp: (!) 96.3 °F (35.7 °C)   SpO2: 97%     General: Awake, Alert, Oriented x 3, Mood and affect appropriate  Respiratory: Respirations even and unlabored  Cardiovascular: Peripheral pulses intact; no edema  Musculoskeletal Exam: Tenderness palpation left lateral hip    ASA Score: 2    Patient/Chart Verification  Patient ID Verified: Verbal, Armband  ID Band Applied: Yes  Consents Confirmed: Procedural  H&P( within 30 days) Verified: Yes  Interval H&P(within 24 hr) Complete (required for Outpatients and Surgery Admit only): Yes  Beta Blocker given : N/A  Pre-op Lab/Test Results Available: N/A  Does Patient Have a Prosthetic Device/Implant: Yes    Assessment: Hip arthritis  Plan: Left hip injection

## 2024-02-07 NOTE — OP NOTE
OPERATIVE REPORT  PATIENT NAME: Anshul Fournier    :  1965  MRN: [id number]  Pt Location: [facility] MINOR/PAIN ROOM 01    SURGERY DATE: 2024    Surgeons and Role:     * Fernandez Vega DO - Primary    Preop Diagnosis:  Primary osteoarthritis of right hip [M16.11]    Post-Op Diagnosis Codes:     * Primary osteoarthritis of right hip [M16.11]    Procedure(s):  Left - LEFT INTRA-ARTICULAR HIP INJECTION    Indication:  Hip pain  Preoperative diagnosis:                     Hip arthropathy  Postoperative diagnosis:                     Hip arthropathy     Procedure:  Fluoroscopically-guided left intraarticular hip injection     EBL:  none  Specimens:  not applicable        After discussing the risks, benefits, and alternatives to the procedure, the patient expressed understanding and wished to proceed.  The patient was brought to the fluoroscopic suite and placed in the supine position. Procedural pause conducted to verify:  correct patient identity, procedure to be performed, and as applicable, correct side and site, correct patient position, and availability of implants, special equipment and special requirements.  The femoral artery was palpated.  Using fluoroscopy, an AP view was utilized to visualize the hip joint.  Next, a point at the junction of the ipsilateral femoral head and femoral neck was identified, and noted to be a safe distance lateral to the pulsation of the aforementioned femoral artery.  The skin was sterilely prepped and draped in the usual fashion using Chloraprep skin prep.  The skin and subcutaneous tissues were anesthetized with 1% lidocaine.  Using fluoroscopic guidance, a 3.5 inch 22 gauge spinal needle was advanced into the joint capsule.  After negative aspiration, Omnipaque 240 contrast was injected which confirmed intra-articular placement without evidence of intravascular uptake. A 4 ml solution consisting of 80 mg of Depo-Medrol in 3 mL of 0.25% bupivacaine was injected.  The  needle was withdrawn from the skin.  The patient tolerated the procedure well, and there were no apparent complications.  After appropriate observation, the patient was dismissed from the outpatient procedure area in good condition under their own power.                                    SIGNATURE: Fernandez Vega,   DATE: February 7, 2024  TIME: 11:11 AM

## 2024-02-07 NOTE — DISCHARGE INSTRUCTIONS

## 2024-02-14 ENCOUNTER — TELEPHONE (OUTPATIENT)
Dept: PAIN MEDICINE | Facility: CLINIC | Age: 59
End: 2024-02-14

## 2024-02-14 NOTE — TELEPHONE ENCOUNTER
Pt reports 50-60% improvement post inj  Pain level 0/10  Pt aware I will call next week for an update

## 2024-03-20 LAB
ALBUMIN SERPL-MCNC: 4.9 G/DL (ref 3.8–4.9)
ALBUMIN/CREAT UR: 11 MG/G CREAT (ref 0–29)
ALBUMIN/GLOB SERPL: 2.3 {RATIO} (ref 1.2–2.2)
ALP SERPL-CCNC: 82 IU/L (ref 44–121)
ALT SERPL-CCNC: 36 IU/L (ref 0–44)
AST SERPL-CCNC: 22 IU/L (ref 0–40)
BILIRUB SERPL-MCNC: 0.2 MG/DL (ref 0–1.2)
BUN SERPL-MCNC: 15 MG/DL (ref 6–24)
BUN/CREAT SERPL: 18 (ref 9–20)
CALCIUM SERPL-MCNC: 9.9 MG/DL (ref 8.7–10.2)
CHLORIDE SERPL-SCNC: 103 MMOL/L (ref 96–106)
CO2 SERPL-SCNC: 17 MMOL/L (ref 20–29)
CREAT SERPL-MCNC: 0.85 MG/DL (ref 0.76–1.27)
CREAT UR-MCNC: 48 MG/DL
EGFR: 100 ML/MIN/1.73
EST. AVERAGE GLUCOSE BLD GHB EST-MCNC: 174 MG/DL
GLOBULIN SER-MCNC: 2.1 G/DL (ref 1.5–4.5)
GLUCOSE SERPL-MCNC: 139 MG/DL (ref 70–99)
HBA1C MFR BLD: 7.7 % (ref 4.8–5.6)
MICROALBUMIN UR-MCNC: 5.5 UG/ML
POTASSIUM SERPL-SCNC: 4.9 MMOL/L (ref 3.5–5.2)
PROT SERPL-MCNC: 7 G/DL (ref 6–8.5)
PSA SERPL-MCNC: 0.4 NG/ML (ref 0–4)
SL AMB REFLEX CRITERIA: NORMAL
SODIUM SERPL-SCNC: 141 MMOL/L (ref 134–144)

## 2024-03-25 ENCOUNTER — OFFICE VISIT (OUTPATIENT)
Dept: FAMILY MEDICINE CLINIC | Facility: CLINIC | Age: 59
End: 2024-03-25
Payer: COMMERCIAL

## 2024-03-25 ENCOUNTER — TELEPHONE (OUTPATIENT)
Age: 59
End: 2024-03-25

## 2024-03-25 VITALS
HEIGHT: 73 IN | RESPIRATION RATE: 16 BRPM | WEIGHT: 248 LBS | SYSTOLIC BLOOD PRESSURE: 118 MMHG | BODY MASS INDEX: 32.87 KG/M2 | TEMPERATURE: 97.4 F | DIASTOLIC BLOOD PRESSURE: 68 MMHG | HEART RATE: 76 BPM

## 2024-03-25 DIAGNOSIS — E78.2 MIXED HYPERLIPIDEMIA: ICD-10-CM

## 2024-03-25 DIAGNOSIS — E11.9 TYPE 2 DIABETES MELLITUS WITHOUT COMPLICATION, WITHOUT LONG-TERM CURRENT USE OF INSULIN (HCC): Primary | ICD-10-CM

## 2024-03-25 DIAGNOSIS — E66.9 OBESITY (BMI 30-39.9): ICD-10-CM

## 2024-03-25 PROCEDURE — 99214 OFFICE O/P EST MOD 30 MIN: CPT | Performed by: NURSE PRACTITIONER

## 2024-03-25 RX ORDER — BLOOD SUGAR DIAGNOSTIC
STRIP MISCELLANEOUS
Qty: 100 EACH | Refills: 3 | Status: SHIPPED | OUTPATIENT
Start: 2024-03-25

## 2024-03-25 RX ORDER — LANCETS 33 GAUGE
EACH MISCELLANEOUS
Qty: 100 EACH | Refills: 3 | Status: SHIPPED | OUTPATIENT
Start: 2024-03-25

## 2024-03-25 RX ORDER — BLOOD-GLUCOSE METER
KIT MISCELLANEOUS
Qty: 1 KIT | Refills: 0 | Status: SHIPPED | OUTPATIENT
Start: 2024-03-25

## 2024-03-25 NOTE — ASSESSMENT & PLAN NOTE
A1c improved, however not at goal.  Will add Jardiance.  He will let me know if there are any issues with coverage     Lab Results   Component Value Date    HGBA1C 7.7 (H) 03/18/2024

## 2024-03-25 NOTE — PROGRESS NOTES
Assessment/Plan:    1. Type 2 diabetes mellitus without complication, without long-term current use of insulin ([facility])  Assessment & Plan:  A1c improved, however not at goal.  Will add Jardiance.  He will let me know if there are any issues with coverage     Lab Results   Component Value Date    HGBA1C 7.7 (H) 03/18/2024     Orders:  -     Blood Glucose Monitoring Suppl (OneTouch Verio Reflect) w/Device KIT; Check blood sugars once daily. Please substitute with appropriate alternative as covered by patient's insurance. Dx: E11.65  -     glucose blood (OneTouch Verio) test strip; Check blood sugars once daily. Please substitute with appropriate alternative as covered by patient's insurance. Dx: E11.65  -     OneTouch Delica Lancets 33G MISC; Check blood sugars once daily. Please substitute with appropriate alternative as covered by patient's insurance. Dx: E11.65  -     Comprehensive metabolic panel; Future; Expected date: 06/25/2024  -     Lipid Panel with Direct LDL reflex; Future; Expected date: 06/25/2024  -     Hemoglobin A1C; Future; Expected date: 06/25/2024  -     Comprehensive metabolic panel  -     Lipid Panel with Direct LDL reflex  -     Hemoglobin A1C  -     Empagliflozin 25 MG TABS; Take 1 tablet (25 mg total) by mouth daily    2. Mixed hyperlipidemia  Assessment & Plan:  Continue statin      Orders:  -     Comprehensive metabolic panel; Future; Expected date: 06/25/2024  -     Lipid Panel with Direct LDL reflex; Future; Expected date: 06/25/2024  -     Hemoglobin A1C; Future; Expected date: 06/25/2024  -     Comprehensive metabolic panel  -     Lipid Panel with Direct LDL reflex  -     Hemoglobin A1C    3. Obesity (BMI 30-39.9)  Assessment & Plan:  Continuing to work on diet and exercise               There are no Patient Instructions on file for this visit.    Return in about 3 months (around 6/25/2024).    Subjective:      Patient ID: Anshul Fournier is a 59 y.o. male.    Chief Complaint   Patient  "presents with   • Follow-up     Review labs [email or web address]       Here today for follow up, labs prior.  No concerns today.  He is continuing to work on his diet and staying active.        The following portions of the patient's history were reviewed and updated as appropriate: allergies, current medications, past family history, past medical history, past social history, past surgical history and problem list.    Review of Systems   Constitutional: Negative.    Respiratory: Negative.     Cardiovascular: Negative.    Gastrointestinal: Negative.    Neurological: Negative.    Psychiatric/Behavioral: Negative.           Current Outpatient Medications   Medication Sig Dispense Refill   • Blood Glucose Monitoring Suppl (OneTouch Verio Reflect) w/Device KIT Check blood sugars once daily. Please substitute with appropriate alternative as covered by patient's insurance. Dx: E11.65 1 kit 0   • Empagliflozin 25 MG TABS Take 1 tablet (25 mg total) by mouth daily 30 tablet 3   • glucose blood (OneTouch Verio) test strip Check blood sugars once daily. Please substitute with appropriate alternative as covered by patient's insurance. Dx: E11.65 100 each 3   • metFORMIN (GLUCOPHAGE) 500 mg tablet TAKE 2 TABLETS BY MOUTH TWICE A DAY WITH FOOD 360 tablet 1   • OneTouch Delica Lancets 33G MISC Check blood sugars once daily. Please substitute with appropriate alternative as covered by patient's insurance. Dx: E11.65 100 each 3   • rosuvastatin (CRESTOR) 10 MG tablet TAKE 1 TABLET BY MOUTH EVERY DAY 90 tablet 1     No current facility-administered medications for this visit.       Objective:    /68   Pulse 76   Temp (!) 97.4 °F (36.3 °C)   Resp 16   Ht 6' 1\" (1.854 m)   Wt 112 kg (248 lb)   BMI 32.72 kg/m²        Physical Exam  Vitals and nursing note reviewed.   Constitutional:       Appearance: Normal appearance. He is well-developed.   Cardiovascular:      Rate and Rhythm: Normal rate and regular rhythm.      Pulses: Normal " pulses.      Heart sounds: Normal heart sounds. No murmur heard.  Pulmonary:      Effort: Pulmonary effort is normal.      Breath sounds: Normal breath sounds.   Skin:     General: Skin is warm and dry.   Neurological:      Mental Status: He is alert.   Psychiatric:         Mood and Affect: Mood normal.         Behavior: Behavior normal.                IRMA Bustillo

## 2024-04-16 DIAGNOSIS — E11.9 TYPE 2 DIABETES MELLITUS WITHOUT COMPLICATION, WITHOUT LONG-TERM CURRENT USE OF INSULIN (HCC): ICD-10-CM

## 2024-07-09 LAB
ALBUMIN SERPL-MCNC: 4.9 G/DL (ref 3.8–4.9)
ALP SERPL-CCNC: 70 IU/L (ref 44–121)
ALT SERPL-CCNC: 22 IU/L (ref 0–44)
AST SERPL-CCNC: 17 IU/L (ref 0–40)
BILIRUB SERPL-MCNC: 0.4 MG/DL (ref 0–1.2)
BUN SERPL-MCNC: 16 MG/DL (ref 8–27)
BUN/CREAT SERPL: 24 (ref 10–24)
CALCIUM SERPL-MCNC: 9.7 MG/DL (ref 8.6–10.2)
CHLORIDE SERPL-SCNC: 104 MMOL/L (ref 96–106)
CHOLEST SERPL-MCNC: 129 MG/DL (ref 100–199)
CO2 SERPL-SCNC: 22 MMOL/L (ref 20–29)
CREAT SERPL-MCNC: 0.68 MG/DL (ref 0.76–1.27)
EGFR: 106 ML/MIN/1.73
EST. AVERAGE GLUCOSE BLD GHB EST-MCNC: 137 MG/DL
GLOBULIN SER-MCNC: 2.1 G/DL (ref 1.5–4.5)
GLUCOSE SERPL-MCNC: 113 MG/DL (ref 70–99)
HBA1C MFR BLD: 6.4 % (ref 4.8–5.6)
HDLC SERPL-MCNC: 35 MG/DL
LDLC SERPL CALC-MCNC: 70 MG/DL (ref 0–99)
LDLC/HDLC SERPL: 2 RATIO (ref 0–3.6)
MICRODELETION SYND BLD/T FISH: NORMAL
POTASSIUM SERPL-SCNC: 4.4 MMOL/L (ref 3.5–5.2)
PROT SERPL-MCNC: 7 G/DL (ref 6–8.5)
SL AMB VLDL CHOLESTEROL CALC: 24 MG/DL (ref 5–40)
SODIUM SERPL-SCNC: 140 MMOL/L (ref 134–144)
TRIGL SERPL-MCNC: 134 MG/DL (ref 0–149)

## 2024-07-15 ENCOUNTER — OFFICE VISIT (OUTPATIENT)
Dept: FAMILY MEDICINE CLINIC | Facility: CLINIC | Age: 59
End: 2024-07-15
Payer: COMMERCIAL

## 2024-07-15 VITALS
SYSTOLIC BLOOD PRESSURE: 124 MMHG | HEART RATE: 92 BPM | HEIGHT: 73 IN | RESPIRATION RATE: 18 BRPM | TEMPERATURE: 97.6 F | DIASTOLIC BLOOD PRESSURE: 74 MMHG | WEIGHT: 233.8 LBS | BODY MASS INDEX: 30.99 KG/M2

## 2024-07-15 DIAGNOSIS — M25.512 ACUTE PAIN OF LEFT SHOULDER: ICD-10-CM

## 2024-07-15 DIAGNOSIS — E66.9 OBESITY (BMI 30-39.9): ICD-10-CM

## 2024-07-15 DIAGNOSIS — E78.2 MIXED HYPERLIPIDEMIA: ICD-10-CM

## 2024-07-15 DIAGNOSIS — E11.9 TYPE 2 DIABETES MELLITUS WITHOUT COMPLICATION, WITHOUT LONG-TERM CURRENT USE OF INSULIN (HCC): Primary | ICD-10-CM

## 2024-07-15 PROCEDURE — 99214 OFFICE O/P EST MOD 30 MIN: CPT | Performed by: NURSE PRACTITIONER

## 2024-07-15 RX ORDER — MELOXICAM 15 MG/1
15 TABLET ORAL DAILY
Qty: 30 TABLET | Refills: 0 | Status: SHIPPED | OUTPATIENT
Start: 2024-07-15

## 2024-07-15 NOTE — PROGRESS NOTES
Ambulatory Visit  Name: Anshul Fournier      : 1965      MRN: [id number]  Encounter Provider: IRMA Bustillo  Encounter Date: 7/15/2024   Encounter department: [facility]    Assessment & Plan   1. Type 2 diabetes mellitus without complication, without long-term current use of insulin (HCC)  Assessment & Plan:  Significantly improved with Jardiance and weight loss efforts.  Labs to be repeated prior to follow-up    Lab Results   Component Value Date    HGBA1C 6.4 (H) 2024     Orders:  -     Empagliflozin 25 MG TABS; Take 1 tablet (25 mg total) by mouth daily  -     Comprehensive metabolic panel; Future; Expected date: 11/15/2024  -     Hemoglobin A1C; Future; Expected date: 11/15/2024  -     Lipid Panel with Direct LDL reflex; Future; Expected date: 11/15/2024  -     Comprehensive metabolic panel  -     Hemoglobin A1C  -     Lipid Panel with Direct LDL reflex  2. Mixed hyperlipidemia  Assessment & Plan:  At goal, continue statin  Orders:  -     Comprehensive metabolic panel; Future; Expected date: 11/15/2024  -     Hemoglobin A1C; Future; Expected date: 11/15/2024  -     Lipid Panel with Direct LDL reflex; Future; Expected date: 11/15/2024  -     Comprehensive metabolic panel  -     Hemoglobin A1C  -     Lipid Panel with Direct LDL reflex  3. Obesity (BMI 30-39.9)  Assessment & Plan:  Continue weight loss efforts   Orders:  -     Comprehensive metabolic panel; Future; Expected date: 11/15/2024  -     Hemoglobin A1C; Future; Expected date: 11/15/2024  -     Lipid Panel with Direct LDL reflex; Future; Expected date: 11/15/2024  -     Comprehensive metabolic panel  -     Hemoglobin A1C  -     Lipid Panel with Direct LDL reflex  4. Acute pain of left shoulder  -     meloxicam (MOBIC) 15 mg tablet; Take 1 tablet (15 mg total) by mouth daily       History of Present Illness     Here today for follow-up on diabetes, labs done prior to visit.  He has lost 30 pounds and has been working on  his diet.  Also with complaints of left shoulder pain for the past 2 to 3 weeks.  Hurts to lift his arm, no known injury.  No associated numbness or tingling        Review of Systems   Constitutional: Negative.    Respiratory: Negative.     Cardiovascular: Negative.    Gastrointestinal: Negative.    Neurological: Negative.    Psychiatric/Behavioral: Negative.       Current Outpatient Medications on File Prior to Visit   Medication Sig Dispense Refill   • metFORMIN (GLUCOPHAGE) 500 mg tablet TAKE 2 TABLETS BY MOUTH TWICE A DAY WITH FOOD 360 tablet 1   • rosuvastatin (CRESTOR) 10 MG tablet TAKE 1 TABLET BY MOUTH EVERY DAY 90 tablet 1   • [DISCONTINUED] Empagliflozin 25 MG TABS Take 1 tablet (25 mg total) by mouth daily 30 tablet 3   • [DISCONTINUED] Blood Glucose Monitoring Suppl (OneTouch Verio Reflect) w/Device KIT Check blood sugars once daily. Please substitute with appropriate alternative as covered by patient's insurance. Dx: E11.65 (Patient not taking: Reported on 7/15/2024) 1 kit 0   • [DISCONTINUED] glucose blood (OneTouch Verio) test strip Check blood sugars once daily. Please substitute with appropriate alternative as covered by patient's insurance. Dx: E11.65 (Patient not taking: Reported on 7/15/2024) 100 each 3   • [DISCONTINUED] OneTouch Delica Lancets 33G MISC Check blood sugars once daily. Please substitute with appropriate alternative as covered by patient's insurance. Dx: E11.65 (Patient not taking: Reported on 7/15/2024) 100 each 3     No current facility-administered medications on file prior to visit.      Social History     Tobacco Use   • Smoking status: Former     Current packs/day: 0.00     Average packs/day: 1.5 packs/day for 40.0 years (60.0 ttl pk-yrs)     Types: Cigarettes     Start date: 3/28/1977     Quit date: 3/28/2017     Years since quittin.3     Passive exposure: Never   • Smokeless tobacco: Never   Vaping Use   • Vaping status: Never Used   Substance and Sexual Activity   •  "Alcohol use: Not Currently     Comment: rare   • Drug use: No   • Sexual activity: Yes     Partners: Female     Birth control/protection: None     Objective     /74   Pulse 92   Temp 97.6 °F (36.4 °C)   Resp 18   Ht 6' 1\" (1.854 m)   Wt 106 kg (233 lb 12.8 oz)   BMI 30.85 kg/m²     Physical Exam  Vitals and nursing note reviewed.   Constitutional:       General: He is not in acute distress.     Appearance: Normal appearance.   HENT:      Head: Normocephalic and atraumatic.   Eyes:      Conjunctiva/sclera: Conjunctivae normal.   Neck:      Vascular: No carotid bruit.   Cardiovascular:      Rate and Rhythm: Normal rate and regular rhythm.      Pulses: Normal pulses.      Heart sounds: Normal heart sounds. No murmur heard.  Pulmonary:      Effort: Pulmonary effort is normal.      Breath sounds: Normal breath sounds.   Skin:     General: Skin is warm and dry.   Neurological:      Mental Status: He is alert.   Psychiatric:         Mood and Affect: Mood normal.         Behavior: Behavior normal.       Administrative Statements           "

## 2024-07-15 NOTE — ASSESSMENT & PLAN NOTE
Significantly improved with Jardiance and weight loss efforts.  Labs to be repeated prior to follow-up    Lab Results   Component Value Date    HGBA1C 6.4 (H) 07/08/2024

## 2024-09-03 ENCOUNTER — PATIENT MESSAGE (OUTPATIENT)
Dept: FAMILY MEDICINE CLINIC | Facility: CLINIC | Age: 59
End: 2024-09-03

## 2024-09-03 ENCOUNTER — TELEPHONE (OUTPATIENT)
Age: 59
End: 2024-09-03

## 2024-09-03 NOTE — TELEPHONE ENCOUNTER
Pt. Wants to speak to Tamera Stinson.  He was in about a month ago and Tamera gave him a muscle relaxer for his right arm but it has not helped and it is hurting.  He doesn't want to come in, he wants Tamera to call him and let him know if she wants him to see someone else and if so, who? Or does she want to see him.  Pls ask Tamera to return his call.    Ty

## 2024-09-04 ENCOUNTER — PATIENT MESSAGE (OUTPATIENT)
Dept: FAMILY MEDICINE CLINIC | Facility: CLINIC | Age: 59
End: 2024-09-04

## 2024-09-04 DIAGNOSIS — M25.512 ACUTE PAIN OF LEFT SHOULDER: Primary | ICD-10-CM

## 2024-09-05 ENCOUNTER — OFFICE VISIT (OUTPATIENT)
Dept: OBGYN CLINIC | Facility: CLINIC | Age: 59
End: 2024-09-05
Payer: COMMERCIAL

## 2024-09-05 ENCOUNTER — APPOINTMENT (OUTPATIENT)
Dept: RADIOLOGY | Facility: CLINIC | Age: 59
End: 2024-09-05
Payer: COMMERCIAL

## 2024-09-05 VITALS
DIASTOLIC BLOOD PRESSURE: 76 MMHG | WEIGHT: 233 LBS | HEIGHT: 73 IN | BODY MASS INDEX: 30.88 KG/M2 | SYSTOLIC BLOOD PRESSURE: 125 MMHG | HEART RATE: 85 BPM

## 2024-09-05 DIAGNOSIS — M25.512 ACUTE PAIN OF LEFT SHOULDER: ICD-10-CM

## 2024-09-05 DIAGNOSIS — M75.82 TENDINITIS OF LEFT ROTATOR CUFF: Primary | ICD-10-CM

## 2024-09-05 PROCEDURE — 20610 DRAIN/INJ JOINT/BURSA W/O US: CPT | Performed by: ORTHOPAEDIC SURGERY

## 2024-09-05 PROCEDURE — 99213 OFFICE O/P EST LOW 20 MIN: CPT | Performed by: ORTHOPAEDIC SURGERY

## 2024-09-05 PROCEDURE — 73030 X-RAY EXAM OF SHOULDER: CPT

## 2024-09-05 PROCEDURE — 3074F SYST BP LT 130 MM HG: CPT | Performed by: ORTHOPAEDIC SURGERY

## 2024-09-05 PROCEDURE — 3078F DIAST BP <80 MM HG: CPT | Performed by: ORTHOPAEDIC SURGERY

## 2024-09-05 RX ADMIN — TRIAMCINOLONE ACETONIDE 40 MG: 40 INJECTION, SUSPENSION INTRA-ARTICULAR; INTRAMUSCULAR at 11:30

## 2024-09-05 NOTE — PROGRESS NOTES
"Orthopedic Sports Medicine New Patient Visit     Assesment:   59 y.o. male with left rotator cuff tendinitis    Plan:    Anshul is a pleasant 59 y.o. male who presents for initial evaluation of the left shoulder. I believe he may be symptomatic of rotator cuff tendinitis or subacromial bursitis. He may also be symptomatic of shoulder impingement, as well. I recommend he attend physical therapy to improve his range of motion and strength. He may also complete home exercises. He elected to proceed with home exercises before attending formal physical therapy. I offered him a corticosteroid injection of the left subacromial bursa. He was amenable to this plan today. He tolerated the procedure well. Injection aftercare instructions were provided today. He may follow-up in 8 weeks for re-evaluation of the left shoulder.    Large joint arthrocentesis: L subacromial bursa  Universal Protocol:  Consent: Verbal consent obtained.  Risks and benefits: risks, benefits and alternatives were discussed  Consent given by: patient  Time out: Immediately prior to procedure a \"time out\" was called to verify the correct patient, procedure, equipment, support staff and site/side marked as required.  Patient understanding: patient states understanding of the procedure being performed  Site marked: the operative site was marked  Patient identity confirmed: verbally with patient  Supporting Documentation  Indications: pain   Procedure Details  Location: shoulder - L subacromial bursa  Preparation: Patient was prepped and draped in the usual sterile fashion  Needle size: 22 G  Approach: posterolateral  Medications administered: 40 mg triamcinolone acetonide 40 mg/mL (4 mL ropivacaine 0.2%)    Patient tolerance: patient tolerated the procedure well with no immediate complications  Dressing:  Sterile dressing applied           Follow up:    Return in about 8 weeks (around 10/31/2024) for Recheck.        Chief Complaint   Patient presents with "   • Left Shoulder - Pain       History of Present Illness:    The patient is a 59 y.o., right hand dominant, male, who presents for initial evaluation of the left shoulder. The left shoulder began bothering him several months ago and has been progressively getting worse. He saw his PCP  about one month ago who believe he may have sustained a muscle strain. He was prescribed muscle relaxers, which did not provide him with relief. Today he indicates his pain is located on the lateral aspect of the left shoulder. He has difficulty reaching behind and overhead is painful. He has not completed any formal physical therapy for the left shoulder. He has taken dual action Advil and used topical anti-inflammatories with minimal relief.    Shoulder Surgical History:  None    Past Medical, Social and Family History:  Past Medical History:   Diagnosis Date   • Diabetes mellitus (HCC)    • Hyperlipidemia      Past Surgical History:   Procedure Laterality Date   • HERNIA REPAIR Left 4/11/2021    Procedure: REPAIR HERNIA INGUINAL, umbilical hernia repair;  Surgeon: Virgie Rowland MD;  Location: [facility] OR;  Service: General   • PALATE / UVULA BIOPSY / EXCISION     • RI ARTHROCENTESIS ASPIR&/INJ MAJOR JT/BURSA W/O US Left 4/5/2023    Procedure: intra articular hip joint injection ([id number]);  Surgeon: Fernandez Vega DO;  Location: [facility] MAIN OR;  Service: Pain Management    • RI ARTHROCENTESIS ASPIR&/INJ MAJOR JT/BURSA W/O  Left 2/7/2024    Procedure: LEFT INTRA-ARTICULAR HIP INJECTION;  Surgeon: Fernandez Vega DO;  Location: [facility] MAIN OR;  Service: Pain Management    • TONSILLECTOMY AND ADENOIDECTOMY     • VASECTOMY       Allergies   Allergen Reactions   • Indocin [Indomethacin] Vomiting     Current Outpatient Medications on File Prior to Visit   Medication Sig Dispense Refill   • Empagliflozin 25 MG TABS Take 1 tablet (25 mg total) by mouth daily 90 tablet 1   • metFORMIN (GLUCOPHAGE) 500 mg tablet TAKE 2 TABLETS  BY MOUTH TWICE A DAY WITH FOOD 360 tablet 1   • rosuvastatin (CRESTOR) 10 MG tablet TAKE 1 TABLET BY MOUTH EVERY DAY 90 tablet 1   • meloxicam (MOBIC) 15 mg tablet Take 1 tablet (15 mg total) by mouth daily 30 tablet 0     No current facility-administered medications on file prior to visit.     Social History     Socioeconomic History   • Marital status: /Civil Union     Spouse name: Not on file   • Number of children: Not on file   • Years of education: Not on file   • Highest education level: Not on file   Occupational History   • Not on file   Tobacco Use   • Smoking status: Former     Current packs/day: 0.00     Average packs/day: 1.5 packs/day for 40.0 years (60.0 ttl pk-yrs)     Types: Cigarettes     Start date: 3/28/1977     Quit date: 3/28/2017     Years since quittin.4     Passive exposure: Never   • Smokeless tobacco: Never   Vaping Use   • Vaping status: Never Used   Substance and Sexual Activity   • Alcohol use: Not Currently     Comment: rare   • Drug use: No   • Sexual activity: Yes     Partners: Female     Birth control/protection: None   Other Topics Concern   • Not on file   Social History Narrative   • Not on file     Social Determinants of Health     Financial Resource Strain: Not on file   Food Insecurity: Not on file   Transportation Needs: Not on file   Physical Activity: Not on file   Stress: Not on file   Social Connections: Not on file   Intimate Partner Violence: Not on file   Housing Stability: Not on file         I have reviewed the past medical, surgical, social and family history, medications and allergies as documented in the EMR.    Review of systems: ROS is negative other than that noted in the HPI.  Constitutional: Negative for fatigue and fever.   HENT: Negative for sore throat.    Respiratory: Negative for shortness of breath.    Cardiovascular: Negative for chest pain.   Gastrointestinal: Negative for abdominal pain.   Endocrine: Negative for cold intolerance and heat  "intolerance.   Genitourinary: Negative for flank pain.   Musculoskeletal: Negative for back pain.   Skin: Negative for rash.   Allergic/Immunologic: Negative for immunocompromised state.   Neurological: Negative for dizziness.   Psychiatric/Behavioral: Negative for agitation.      Physical Exam:    Blood pressure 125/76, pulse 85, height 6' 1\" (1.854 m), weight 106 kg (233 lb).    General/Constitutional: NAD, well developed, well nourished  HENT: Normocephalic, atraumatic  CV: Intact distal pulses, regular rate  Resp: No respiratory distress or labored breathing  Abdomen: soft, nondistended, non tender   Lymphatic: No lymphadenopathy palpated  Neuro: Alert and Oriented x 3, no focal deficits  Psych: Normal mood, normal affect  Skin: Warm, dry, no rashes, no erythema      Shoulder Exam:      Inspection: No ecchymosis, edema, or deformity. No visualized wounds or skin lesions   Palpation: medial upper trapezius  Active Motion:       FF: 150° compared to 160° on the right        ER: 40° bilaterally        IR: Left Hip  Strength: 4+/5 empty can, 4+/5 ER,  5/5 IR   Sensory - SILT in the Radial / Ulnar / Median / Axillary nerve distributions  Motor - AIN / PIN / Radial / Ulnar / Median / Axillary motor nerves in tact  Palpable Radial pulse  Cap refill <2secs in all digits    Positive Feng    Imaging    I reviewed and interpreted X-rays of the left shoulder which show no significant degenerative changes. No acute fractures or dislocations.    Scribe Attestation    I,:  Deana Guerrero am acting as a scribe while in the presence of the attending physician.:       I,:  Sandip Blanc MD personally performed the services described in this documentation    as scribed in my presence.:          "

## 2024-09-06 RX ORDER — TRIAMCINOLONE ACETONIDE 40 MG/ML
40 INJECTION, SUSPENSION INTRA-ARTICULAR; INTRAMUSCULAR
Status: COMPLETED | OUTPATIENT
Start: 2024-09-05 | End: 2024-09-05

## 2024-10-14 DIAGNOSIS — E11.9 TYPE 2 DIABETES MELLITUS WITHOUT COMPLICATION, WITHOUT LONG-TERM CURRENT USE OF INSULIN (HCC): ICD-10-CM

## 2024-10-14 DIAGNOSIS — E78.2 MIXED HYPERLIPIDEMIA: ICD-10-CM

## 2024-10-15 RX ORDER — ROSUVASTATIN CALCIUM 10 MG/1
10 TABLET, COATED ORAL DAILY
Qty: 90 TABLET | Refills: 1 | Status: SHIPPED | OUTPATIENT
Start: 2024-10-15

## 2024-10-16 NOTE — TELEPHONE ENCOUNTER
Anshul is calling to see why his Empaglifozin 25mg.  Advised patient that this was ordered with 1 refill. Advised to call pharmacy for fill. He states that you can no longer get through to anyone when he calls the pharmacy. Advised to try again and follow prompts to request refill.

## 2024-10-17 ENCOUNTER — TELEPHONE (OUTPATIENT)
Age: 59
End: 2024-10-17

## 2024-10-17 NOTE — TELEPHONE ENCOUNTER
Pt made appt for pre-op 10/25/24 for rotator cuff surgery 11/14/24.  He is having labs done 10/21/24.    He needs an EKG and would like to get it at his appt.

## 2024-10-21 LAB — HBA1C MFR BLD HPLC: 6.9 %

## 2024-10-22 LAB
ALBUMIN SERPL-MCNC: 4.9 G/DL (ref 3.8–4.9)
ALP SERPL-CCNC: 68 IU/L (ref 44–121)
ALT SERPL-CCNC: 28 IU/L (ref 0–44)
AST SERPL-CCNC: 20 IU/L (ref 0–40)
BILIRUB SERPL-MCNC: 0.5 MG/DL (ref 0–1.2)
BUN SERPL-MCNC: 17 MG/DL (ref 8–27)
BUN/CREAT SERPL: 27 (ref 10–24)
CALCIUM SERPL-MCNC: 9.5 MG/DL (ref 8.6–10.2)
CHLORIDE SERPL-SCNC: 105 MMOL/L (ref 96–106)
CHOLEST SERPL-MCNC: 105 MG/DL (ref 100–199)
CO2 SERPL-SCNC: 19 MMOL/L (ref 20–29)
CREAT SERPL-MCNC: 0.63 MG/DL (ref 0.76–1.27)
EGFR: 109 ML/MIN/1.73
EST. AVERAGE GLUCOSE BLD GHB EST-MCNC: 151 MG/DL
GLOBULIN SER-MCNC: 1.7 G/DL (ref 1.5–4.5)
GLUCOSE SERPL-MCNC: 96 MG/DL (ref 70–99)
HBA1C MFR BLD: 6.9 % (ref 4.8–5.6)
HDLC SERPL-MCNC: 33 MG/DL
LDLC SERPL CALC-MCNC: 54 MG/DL (ref 0–99)
LDLC/HDLC SERPL: 1.6 RATIO (ref 0–3.6)
MICRODELETION SYND BLD/T FISH: NORMAL
POTASSIUM SERPL-SCNC: 4.5 MMOL/L (ref 3.5–5.2)
PROT SERPL-MCNC: 6.6 G/DL (ref 6–8.5)
SL AMB VLDL CHOLESTEROL CALC: 18 MG/DL (ref 5–40)
SODIUM SERPL-SCNC: 141 MMOL/L (ref 134–144)
TRIGL SERPL-MCNC: 95 MG/DL (ref 0–149)

## 2024-10-25 ENCOUNTER — CONSULT (OUTPATIENT)
Dept: FAMILY MEDICINE CLINIC | Facility: CLINIC | Age: 59
End: 2024-10-25
Payer: COMMERCIAL

## 2024-10-25 VITALS
DIASTOLIC BLOOD PRESSURE: 64 MMHG | HEIGHT: 71 IN | TEMPERATURE: 97.1 F | HEART RATE: 80 BPM | SYSTOLIC BLOOD PRESSURE: 110 MMHG | WEIGHT: 238 LBS | BODY MASS INDEX: 33.32 KG/M2 | RESPIRATION RATE: 16 BRPM

## 2024-10-25 DIAGNOSIS — E78.2 MIXED HYPERLIPIDEMIA: ICD-10-CM

## 2024-10-25 DIAGNOSIS — M75.122 NONTRAUMATIC COMPLETE TEAR OF LEFT ROTATOR CUFF: Primary | ICD-10-CM

## 2024-10-25 DIAGNOSIS — E11.9 TYPE 2 DIABETES MELLITUS WITHOUT COMPLICATION, WITHOUT LONG-TERM CURRENT USE OF INSULIN (HCC): ICD-10-CM

## 2024-10-25 DIAGNOSIS — Z13.6 SCREENING FOR CARDIOVASCULAR CONDITION: ICD-10-CM

## 2024-10-25 DIAGNOSIS — Z13.1 SCREENING FOR DIABETES MELLITUS: ICD-10-CM

## 2024-10-25 DIAGNOSIS — Z12.5 SCREENING PSA (PROSTATE SPECIFIC ANTIGEN): ICD-10-CM

## 2024-10-25 PROCEDURE — 92250 FUNDUS PHOTOGRAPHY W/I&R: CPT | Performed by: NURSE PRACTITIONER

## 2024-10-25 PROCEDURE — 99214 OFFICE O/P EST MOD 30 MIN: CPT | Performed by: NURSE PRACTITIONER

## 2024-10-25 RX ORDER — ZINC GLUCONATE 50 MG
50 TABLET ORAL DAILY
COMMUNITY

## 2024-10-25 RX ORDER — MULTIVITAMIN
1 TABLET ORAL DAILY
COMMUNITY

## 2024-10-25 NOTE — PROGRESS NOTES
Pre-operative Clearance  Name: Anshul Fournier      : 1965      MRN: [id number]  Encounter Provider: IRMA Bustillo  Encounter Date: 10/25/2024   Encounter department: [facility]    Assessment & Plan  Nontraumatic complete tear of left rotator cuff  Proceed with surgery as planned, medically optimized       Type 2 diabetes mellitus without complication, without long-term current use of insulin (HCC)  Well controlled on current medications.  Lab Results   Component Value Date    HGBA1C 6.9 (H) 10/21/2024       Orders:    IRIS Diabetic eye exam    Microalbumin,Urine; Future    Microalbumin,Urine    Mixed hyperlipidemia  Rosuvastatin 10mg daily       Screening for cardiovascular condition    Orders:    Comprehensive metabolic panel; Future    Lipid Panel with Direct LDL reflex; Future    Comprehensive metabolic panel    Lipid Panel with Direct LDL reflex    Screening for diabetes mellitus    Orders:    Hemoglobin A1C; Future    Hemoglobin A1C    Screening PSA (prostate specific antigen)    Orders:    PSA Total (Reflex To Free); Future    PSA Total (Reflex To Free)    Pre-operative Clearance:     Revised Cardiac Risk Index:  RCI RISK CLASS I (0 risk factors, risk of major cardiac complications approximately 0.5%)    Clearance:  Patient is medically optimized (CLEARED) for proposed surgery without any additional cardiac testing.      Medication Instructions:   - Avoid herbs or non-directed vitamins one week prior to surgery    - Avoid aspirin containing medications or non-steroidal anti-inflammatory drugs one week preceding surgery    - May take tylenol for pain up until the night before surgery    - Hyperlipidemia meds: Continue to take this medication on your normal schedule.      Medicine Instructions for Adults with Diabetes (NO Bowel Prep)    Follow these instructions when a BOWEL PREP is NOT required for your procedure or surgery!    NOTE:  GLP Agonists taken weekly: do not take in the  7 days before your procedure. **Bariatric surgery: do not take 4 weeks prior to your procedure.    SGLT-2 Inhibitors: do not take in the 4 days before your procedure    On the Day Before Surgery/Procedure  If you are having a procedure (e.g., Colonoscopy) or surgery which DOES NOT require a bowel prep, follow the directions below based on the type of medicine you take for your diabetes.  Type of Medicine You Take Examples What to Do   Pre-Mixed Insulin Intermediate  Cpvzylm42/25, Wclxmzg86/30, Novolog 70/30, Regular Insulin Take 1/2 your regular dose the evening before our procedure.   Rapid/Fast Acting  Insulin and/or Long-Acting Insulin Humalog U200, NovoLog, Apidra,  Lantus, Levemir, Tresiba, Toujeo,  Fias, Basaglar Take your FULL regular dose the day before procedure.   Oral Diabetic Medicines (sulfonylurea) Glipizide/Glimepiride/  Glucotrol Take your regular dose with dinner the evening before your procedure.   Other Oral Diabetic Medicines Metformin, Glucophage, Glucophage  XR, Riomet, Glumetza, Actose,  Avandia, Gl set, Prandin Take your regular dose with dinner the evening before your procedure   GLP Agonists Adlyxin, Byetta, Bydureon,  Ozempic, Soliqua, Tanzeum,  Trulicity, Victoza, Saxenda,  Rybelsus, Wegovy, Mounjaro, Zepbound If taken daily, take as normal  If taken weekly, do not take this medicine for 7 days before your procedure including the day of the procedure (resume taking after the procedure). **Bariatric surgery: do not take 4 weeks prior to procedure   SGLT-2 Inhibitors Jardiance, Invokana, Farxiga, Steglatro, Brenzavvy, Qtern, Segluromet Glyxambi, Synjardy, Synjardy XR, Invokamet, InvokametXR, Trijary XR, Xigduo X Do not take for 4 days before your procedure including the day of the procedure (resume taking after the procedure)   This educational material has been approved by the Patient Education Advisory Committee.    On the Day of Surgery/Procedure  Follow the directions below based on the  type of medicine you take for your diabetes.  Type of Medicine You Take  Examples What to Do   Long-Acting Insulin Lantus, Levemir, Tresiba,  Toujeo, Basaglar, Semglee If you normally take your Long Acting Insulin in the morning, take the full dose as scheduled.   GLP-I Agonists Adlyxin, Byetta, Bydureon,  Ozempic, Soliqua, Tanzeum,  Trulicity, Victoza, Saxenda,  Rybelsus, Mounjaro Do NOT take this medicine on the day of your procedure (resume taking after the procedure)   Except for the morning Long-Acting Insulin, DO NOT take ANY diabetic medicine on the day of your procedure unless you were instructed by the doctor who manages your diabetes medicines.  Continue to check your blood sugars.  If you have an insulin pump, ask your endocrinologist for instructions at least 3 days before your procedure. NOTE: If you are not able to ask your endocrinologist in advance, on the day of the procedure set your insulin pump to your basal rate only. Bring your insulin pump supplies to the hospital.         History of Present Illness     Here today for follow up prior to upcoming surgery.  Labs done prior.  No concerns today.  Pt had negative cardiac workup in 2022     Pre-op Exam  Surgery: left rotator cuff repair  Anticipated Date of Surgery: 11/14/24  Surgeon: Dr. Baumann    Previous history of bleeding disorders or clots?: No  Previous Anesthesia reaction?: No  Prolonged steroid use in the last 6 months?: No    Assessment of Cardiac Risk:   - Unstable or severe angina or MI in the last 6 weeks or history of stent placement in the last year?: No   - Decompensated heart failure (e.g. New onset heart failure, NYHA  Class IV heart failure, or worsening existing heart failure)?: No  - Significant arrhythmias such as high grade AV block, symptomatic ventricular arrhythmia, newly recognized ventricular tachycardia, supraventricular tachycardia with resting heart rate >100, or symptomatic bradycardia?: No  - Severe heart valve  disease including aortic stenosis or symptomatic mitral stenosis?: No      Pre-operative Risk Factors:  Elevated-risk surgery: No    History of cerebrovascular disease: No    History of ischemic heart disease: No  Pre-operative treatment with insulin: No  Pre-operative creatinine >2 mg/dL: No    History of congestive heart failure: No    Review of Systems   Constitutional:  Negative for chills, fatigue and fever.   Respiratory:  Negative for cough, shortness of breath and wheezing.    Cardiovascular:  Negative for chest pain, palpitations and leg swelling.   Gastrointestinal:  Negative for abdominal pain, diarrhea, nausea and vomiting.   Skin:  Negative for rash.   Neurological:  Negative for dizziness and headaches.     Past Medical History   Past Medical History:   Diagnosis Date    Diabetes mellitus (HCC)     Hyperlipidemia      Past Surgical History:   Procedure Laterality Date    HERNIA REPAIR Left 2021    Procedure: REPAIR HERNIA INGUINAL, umbilical hernia repair;  Surgeon: Virgie Rowland MD;  Location: [facility];  Service: General    PALATE / UVULA BIOPSY / EXCISION      AZ ARTHROCENTESIS ASPIR&/INJ MAJOR JT/BURSA W/O US Left 2023    Procedure: intra articular hip joint injection ([id number]);  Surgeon: Fernandez Vega DO;  Location: [facility] MAIN OR;  Service: Pain Management     AZ ARTHROCENTESIS ASPIR&/INJ MAJOR JT/BURSA W/O US Left 2024    Procedure: LEFT INTRA-ARTICULAR HIP INJECTION;  Surgeon: Fernandez Vega DO;  Location: [facility] OR;  Service: Pain Management     TONSILLECTOMY AND ADENOIDECTOMY      VASECTOMY       Family History   Problem Relation Age of Onset    Diabetes Father      Social History     Tobacco Use    Smoking status: Former     Current packs/day: 0.00     Average packs/day: 1.5 packs/day for 40.0 years (60.0 ttl pk-yrs)     Types: Cigarettes     Start date: 3/28/1977     Quit date: 3/28/2017     Years since quittin.5     Passive exposure: Never    Smokeless  "tobacco: Never   Vaping Use    Vaping status: Never Used   Substance and Sexual Activity    Alcohol use: Not Currently     Comment: rare    Drug use: No    Sexual activity: Yes     Partners: Female     Birth control/protection: None     Current Outpatient Medications on File Prior to Visit   Medication Sig    Empagliflozin 25 MG TABS Take 1 tablet (25 mg total) by mouth daily    metFORMIN (GLUCOPHAGE) 500 mg tablet Take 2 tablets (1,000 mg total) by mouth 2 (two) times a day with meals    Multiple Vitamin (multivitamin) tablet Take 1 tablet by mouth daily    rosuvastatin (CRESTOR) 10 MG tablet Take 1 tablet (10 mg total) by mouth daily    zinc gluconate 50 mg tablet Take 50 mg by mouth daily    [DISCONTINUED] meloxicam (MOBIC) 15 mg tablet Take 1 tablet (15 mg total) by mouth daily     Allergies   Allergen Reactions    Indocin [Indomethacin] Vomiting     Objective     /64   Pulse 80   Temp (!) 97.1 °F (36.2 °C)   Resp 16   Ht 5' 10.75\" (1.797 m)   Wt 108 kg (238 lb)   BMI 33.43 kg/m²     Physical Exam  Vitals and nursing note reviewed.   Constitutional:       Appearance: Normal appearance. He is well-developed.   HENT:      Head: Normocephalic and atraumatic.      Right Ear: Tympanic membrane, ear canal and external ear normal.      Left Ear: Tympanic membrane, ear canal and external ear normal.      Nose: No mucosal edema or rhinorrhea.      Mouth/Throat:      Pharynx: Oropharynx is clear. Uvula midline.   Eyes:      Conjunctiva/sclera: Conjunctivae normal.   Neck:      Thyroid: No thyromegaly.   Cardiovascular:      Rate and Rhythm: Normal rate and regular rhythm.      Pulses: Normal pulses. no weak pulses.           Dorsalis pedis pulses are 2+ on the right side and 2+ on the left side.        Posterior tibial pulses are 2+ on the right side and 2+ on the left side.      Heart sounds: Normal heart sounds. No murmur heard.  Pulmonary:      Effort: Pulmonary effort is normal.      Breath sounds: Normal " breath sounds.   Abdominal:      General: Bowel sounds are normal. There is no distension.      Palpations: There is no hepatomegaly or splenomegaly.      Tenderness: There is no abdominal tenderness.   Musculoskeletal:      Cervical back: Neck supple. No edema.   Feet:      Right foot:      Skin integrity: No ulcer, skin breakdown, erythema, warmth, callus or dry skin.      Left foot:      Skin integrity: No ulcer, skin breakdown, erythema, warmth, callus or dry skin.   Lymphadenopathy:      Cervical:      Right cervical: No superficial cervical adenopathy.     Left cervical: No superficial cervical adenopathy.   Skin:     General: Skin is warm and dry.      Findings: No rash.   Neurological:      Mental Status: He is alert.   Psychiatric:         Mood and Affect: Mood normal.         Behavior: Behavior normal.     Diabetic Foot Exam    Patient's shoes and socks removed.    Right Foot/Ankle   Right Foot Inspection  Skin Exam: skin normal and skin intact. No dry skin, no warmth, no callus, no erythema, no maceration, no abnormal color, no pre-ulcer, no ulcer and no callus.     Toe Exam: ROM and strength within normal limits.     Sensory   Vibration: intact  Proprioception: intact  Monofilament testing: intact    Vascular  Capillary refills: < 3 seconds  The right DP pulse is 2+. The right PT pulse is 2+.     Left Foot/Ankle  Left Foot Inspection  Skin Exam: skin normal and skin intact. No dry skin, no warmth, no erythema, no maceration, normal color, no pre-ulcer, no ulcer and no callus.     Toe Exam: ROM and strength within normal limits.     Sensory   Vibration: intact  Proprioception: intact  Monofilament testing: intact    Vascular  Capillary refills: < 3 seconds  The left DP pulse is 2+. The left PT pulse is 2+.     Assign Risk Category  No deformity present  Loss of protective sensation  No weak pulses  Risk: 0        IRMA Bustillo

## 2024-10-25 NOTE — ASSESSMENT & PLAN NOTE
Well controlled on current medications.  Lab Results   Component Value Date    HGBA1C 6.9 (H) 10/21/2024       Orders:    IRIS Diabetic eye exam    Microalbumin,Urine; Future    Microalbumin,Urine

## 2024-10-30 ENCOUNTER — TELEPHONE (OUTPATIENT)
Age: 59
End: 2024-10-30

## 2024-10-30 NOTE — TELEPHONE ENCOUNTER
Ivone from Dr Hernandez's office is asking if clearance note can be faxed to her at [phone]. Thank you.

## 2024-10-31 NOTE — TELEPHONE ENCOUNTER
Guerda is calling from Dr. Sarah's office requesting Anshul EKG tracing be faxed over.     Result uploaded in Media.   Please fax to:  [phone]    Thank you!

## 2024-10-31 NOTE — TELEPHONE ENCOUNTER
Printed out EKG and placed into faxing bin with cover sheet. Please fax and note fax confirmation.    Lindsey Segundo LPN

## 2024-11-07 LAB
LEFT EYE DIABETIC RETINOPATHY: NORMAL
RIGHT EYE DIABETIC RETINOPATHY: NORMAL

## 2025-01-10 DIAGNOSIS — E11.9 TYPE 2 DIABETES MELLITUS WITHOUT COMPLICATION, WITHOUT LONG-TERM CURRENT USE OF INSULIN (HCC): ICD-10-CM

## 2025-01-10 RX ORDER — EMPAGLIFLOZIN 25 MG/1
TABLET, FILM COATED ORAL
Qty: 90 TABLET | Refills: 1 | Status: SHIPPED | OUTPATIENT
Start: 2025-01-10

## 2025-01-14 ENCOUNTER — TELEPHONE (OUTPATIENT)
Dept: FAMILY MEDICINE CLINIC | Facility: CLINIC | Age: 60
End: 2025-01-14

## 2025-01-14 NOTE — TELEPHONE ENCOUNTER
Patient is scheduled February 25 for a follow-up.  Please change to a 30-minute CPE/follow-up.  Thanks!

## 2025-02-20 LAB
ALBUMIN SERPL-MCNC: 4.9 G/DL (ref 3.8–4.9)
ALP SERPL-CCNC: 79 IU/L (ref 44–121)
ALT SERPL-CCNC: 25 IU/L (ref 0–44)
AST SERPL-CCNC: 18 IU/L (ref 0–40)
BILIRUB SERPL-MCNC: 0.4 MG/DL (ref 0–1.2)
BUN SERPL-MCNC: 18 MG/DL (ref 8–27)
BUN/CREAT SERPL: 26 (ref 10–24)
CALCIUM SERPL-MCNC: 9.8 MG/DL (ref 8.6–10.2)
CHLORIDE SERPL-SCNC: 103 MMOL/L (ref 96–106)
CHOLEST SERPL-MCNC: 116 MG/DL (ref 100–199)
CO2 SERPL-SCNC: 22 MMOL/L (ref 20–29)
CREAT SERPL-MCNC: 0.69 MG/DL (ref 0.76–1.27)
EGFR: 106 ML/MIN/1.73
EST. AVERAGE GLUCOSE BLD GHB EST-MCNC: 163 MG/DL
GLOBULIN SER-MCNC: 2 G/DL (ref 1.5–4.5)
GLUCOSE SERPL-MCNC: 134 MG/DL (ref 70–99)
HBA1C MFR BLD: 7.3 % (ref 4.8–5.6)
HDLC SERPL-MCNC: 30 MG/DL
LDLC SERPL CALC-MCNC: 52 MG/DL (ref 0–99)
LDLC/HDLC SERPL: 1.7 RATIO (ref 0–3.6)
MICROALBUMIN UR-MCNC: 20.7 UG/ML
MICRODELETION SYND BLD/T FISH: NORMAL
POTASSIUM SERPL-SCNC: 4.9 MMOL/L (ref 3.5–5.2)
PROT SERPL-MCNC: 6.9 G/DL (ref 6–8.5)
PSA SERPL-MCNC: 0.4 NG/ML (ref 0–4)
SL AMB REFLEX CRITERIA: NORMAL
SL AMB VLDL CHOLESTEROL CALC: 34 MG/DL (ref 5–40)
SODIUM SERPL-SCNC: 142 MMOL/L (ref 134–144)
TRIGL SERPL-MCNC: 209 MG/DL (ref 0–149)

## 2025-02-25 ENCOUNTER — OFFICE VISIT (OUTPATIENT)
Dept: FAMILY MEDICINE CLINIC | Facility: CLINIC | Age: 60
End: 2025-02-25
Payer: COMMERCIAL

## 2025-02-25 VITALS
RESPIRATION RATE: 18 BRPM | BODY MASS INDEX: 33.88 KG/M2 | HEART RATE: 92 BPM | HEIGHT: 71 IN | TEMPERATURE: 98.1 F | SYSTOLIC BLOOD PRESSURE: 144 MMHG | WEIGHT: 242 LBS | DIASTOLIC BLOOD PRESSURE: 80 MMHG

## 2025-02-25 DIAGNOSIS — Z00.00 ROUTINE ADULT HEALTH MAINTENANCE: Primary | ICD-10-CM

## 2025-02-25 DIAGNOSIS — E11.621 DIABETIC ULCER OF TOE OF RIGHT FOOT ASSOCIATED WITH TYPE 2 DIABETES MELLITUS, LIMITED TO BREAKDOWN OF SKIN (HCC): ICD-10-CM

## 2025-02-25 DIAGNOSIS — F32.2 SEVERE MAJOR DEPRESSIVE DISORDER (HCC): ICD-10-CM

## 2025-02-25 DIAGNOSIS — E11.9 TYPE 2 DIABETES MELLITUS WITHOUT COMPLICATION, WITHOUT LONG-TERM CURRENT USE OF INSULIN (HCC): ICD-10-CM

## 2025-02-25 DIAGNOSIS — L97.511 DIABETIC ULCER OF TOE OF RIGHT FOOT ASSOCIATED WITH TYPE 2 DIABETES MELLITUS, LIMITED TO BREAKDOWN OF SKIN (HCC): ICD-10-CM

## 2025-02-25 PROCEDURE — 99396 PREV VISIT EST AGE 40-64: CPT | Performed by: NURSE PRACTITIONER

## 2025-02-25 NOTE — PROGRESS NOTES
Adult Annual Physical  Name: Anshul Fournier      : 1965      MRN: [id number]  Encounter Provider: IRMA Bustillo  Encounter Date: 2025   Encounter department: [facility]    Assessment & Plan  Routine adult health maintenance         Severe major depressive disorder (HCC)         Type 2 diabetes mellitus without complication, without long-term current use of insulin (HCC)  Stable on current medications  Lab Results   Component Value Date    HGBA1C 7.3 (H) 2025     Orders:  •  Albumin / creatinine urine ratio; Future  •  Comprehensive metabolic panel; Future  •  Hemoglobin A1C; Future  •  Lipid Panel with Direct LDL reflex; Future    Diabetic ulcer of toe of right foot associated with type 2 diabetes mellitus, limited to breakdown of skin (HCC)    Lab Results   Component Value Date    HGBA1C 7.3 (H) 2025     Orders:  •  Ambulatory referral to Podiatry; Future    Immunizations and preventive care screenings were discussed with patient today. Appropriate education was printed on patient's after visit summary.    Discussed risks and benefits of prostate cancer screening. We discussed the controversial history of PSA screening for prostate cancer in the [place] as well as the risk of over detection and over treatment of prostate cancer by way of PSA screening.  The patient understands that PSA blood testing is an imperfect way to screen for prostate cancer and that elevated PSA levels in the blood may also be caused by infection, inflammation, prostatic trauma or manipulation, urological procedures, or by benign prostatic enlargement.    The role of the digital rectal examination in prostate cancer screening was also discussed and I discussed with him that there is large interobserver variability in the findings of digital rectal examination.    Counseling:  Exercise: the importance of regular exercise/physical activity was discussed. Recommend exercise 3-5 times per  week for at least 30 minutes.   Recommended Shingrix, he will have this done at the pharmacy         History of Present Illness     Adult Annual Physical:  Patient presents for annual physical. Here today for CPE.  Labs done prior.  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     Diet and Physical Activity:  - Diet/Nutrition: low carb diet.  - Exercise: walking.    Depression Screening:  - PHQ-2 Score: 0    General Health:  - Sleep: sleeps well.  - Hearing: normal hearing bilateral ears.  - Vision: goes for regular eye exams.  - Dental: regular dental visits.     Health:    - Urinary symptoms: none.     Review of Systems   Constitutional: Negative.    Respiratory: Negative.     Cardiovascular: Negative.    Gastrointestinal: Negative.    Neurological: Negative.    Psychiatric/Behavioral: Negative.       Current Outpatient Medications on File Prior to Visit   Medication Sig Dispense Refill   • Jardiance 25 MG TABS TAKE 1 TABLET (25 MG TOTAL) BY MOUTH DAILY. 90 tablet 1   • metFORMIN (GLUCOPHAGE) 500 mg tablet Take 2 tablets (1,000 mg total) by mouth 2 (two) times a day with meals 360 tablet 1   • Multiple Vitamin (multivitamin) tablet Take 1 tablet by mouth daily     • rosuvastatin (CRESTOR) 10 MG tablet Take 1 tablet (10 mg total) by mouth daily 90 tablet 1   • zinc gluconate 50 mg tablet Take 50 mg by mouth daily       No current facility-administered medications on file prior to visit.      Social History     Tobacco Use   • Smoking status: Former     Current packs/day: 0.00     Average packs/day: 1.5 packs/day for 40.0 years (60.0 ttl pk-yrs)     Types: Cigarettes     Start date: 3/28/1977     Quit date: 3/28/2017     Years since quittin.9     Passive exposure: Never   • Smokeless tobacco: Never   Vaping Use   • Vaping status: Never Used   Substance and Sexual Activity   • Alcohol use: Not Currently      "Comment: rare   • Drug use: No   • Sexual activity: Yes     Partners: Female     Birth control/protection: None       Objective   /80   Pulse 92   Temp 98.1 °F (36.7 °C)   Resp 18   Ht 5' 10.75\" (1.797 m)   Wt 110 kg (242 lb)   BMI 33.99 kg/m²     Physical Exam  Vitals and nursing note reviewed.   Constitutional:       Appearance: Normal appearance. He is well-developed.   HENT:      Head: Normocephalic and atraumatic.      Right Ear: Tympanic membrane and external ear normal.      Left Ear: Tympanic membrane and external ear normal.      Nose: Nose normal.      Mouth/Throat:      Pharynx: Oropharynx is clear.   Eyes:      Extraocular Movements: Extraocular movements intact.      Conjunctiva/sclera: Conjunctivae normal.      Pupils: Pupils are equal, round, and reactive to light.   Neck:      Vascular: No carotid bruit.   Cardiovascular:      Rate and Rhythm: Normal rate and regular rhythm.      Pulses: Normal pulses.      Heart sounds: Normal heart sounds. No murmur heard.  Pulmonary:      Effort: Pulmonary effort is normal.      Breath sounds: Normal breath sounds.   Abdominal:      General: Bowel sounds are normal.      Palpations: Abdomen is soft.      Tenderness: There is no abdominal tenderness.      Hernia: No hernia is present.   Musculoskeletal:         General: No deformity. Normal range of motion.      Cervical back: Normal range of motion and neck supple.   Skin:     General: Skin is warm and dry.      Capillary Refill: Capillary refill takes less than 2 seconds.   Neurological:      Mental Status: He is alert and oriented to person, place, and time.      Sensory: No sensory deficit.      Coordination: Coordination normal.      Deep Tendon Reflexes: Reflexes normal.   Psychiatric:         Mood and Affect: Mood normal.         Behavior: Behavior normal.         "

## 2025-02-25 NOTE — ASSESSMENT & PLAN NOTE
Stable on current medications  Lab Results   Component Value Date    HGBA1C 7.3 (H) 02/19/2025     Orders:  •  Albumin / creatinine urine ratio; Future  •  Comprehensive metabolic panel; Future  •  Hemoglobin A1C; Future  •  Lipid Panel with Direct LDL reflex; Future

## 2025-03-03 ENCOUNTER — APPOINTMENT (OUTPATIENT)
Dept: RADIOLOGY | Facility: CLINIC | Age: 60
End: 2025-03-03
Payer: COMMERCIAL

## 2025-03-03 ENCOUNTER — TELEPHONE (OUTPATIENT)
Age: 60
End: 2025-03-03

## 2025-03-03 ENCOUNTER — OFFICE VISIT (OUTPATIENT)
Age: 60
End: 2025-03-03
Payer: COMMERCIAL

## 2025-03-03 VITALS — WEIGHT: 242 LBS | BODY MASS INDEX: 33.88 KG/M2 | HEIGHT: 71 IN

## 2025-03-03 DIAGNOSIS — E11.621 DIABETIC ULCER OF TOE OF RIGHT FOOT ASSOCIATED WITH TYPE 2 DIABETES MELLITUS, LIMITED TO BREAKDOWN OF SKIN (HCC): ICD-10-CM

## 2025-03-03 DIAGNOSIS — M20.41 HAMMERTOE OF SECOND TOE OF RIGHT FOOT: Primary | ICD-10-CM

## 2025-03-03 DIAGNOSIS — L97.511 DIABETIC ULCER OF TOE OF RIGHT FOOT ASSOCIATED WITH TYPE 2 DIABETES MELLITUS, LIMITED TO BREAKDOWN OF SKIN (HCC): ICD-10-CM

## 2025-03-03 DIAGNOSIS — L97.512 DIABETIC ULCER OF TOE OF RIGHT FOOT ASSOCIATED WITH TYPE 2 DIABETES MELLITUS, WITH FAT LAYER EXPOSED (HCC): ICD-10-CM

## 2025-03-03 DIAGNOSIS — E11.621 DIABETIC ULCER OF TOE OF RIGHT FOOT ASSOCIATED WITH TYPE 2 DIABETES MELLITUS, WITH FAT LAYER EXPOSED (HCC): ICD-10-CM

## 2025-03-03 DIAGNOSIS — E11.9 TYPE 2 DIABETES MELLITUS WITHOUT COMPLICATION, WITHOUT LONG-TERM CURRENT USE OF INSULIN (HCC): ICD-10-CM

## 2025-03-03 PROCEDURE — 99244 OFF/OP CNSLTJ NEW/EST MOD 40: CPT | Performed by: STUDENT IN AN ORGANIZED HEALTH CARE EDUCATION/TRAINING PROGRAM

## 2025-03-03 PROCEDURE — 73630 X-RAY EXAM OF FOOT: CPT

## 2025-03-03 PROCEDURE — 28010 INCISION OF TOE TENDON: CPT | Performed by: STUDENT IN AN ORGANIZED HEALTH CARE EDUCATION/TRAINING PROGRAM

## 2025-03-03 RX ORDER — CEPHALEXIN 500 MG/1
500 CAPSULE ORAL EVERY 6 HOURS SCHEDULED
Qty: 28 CAPSULE | Refills: 0 | Status: SHIPPED | OUTPATIENT
Start: 2025-03-03 | End: 2025-03-10

## 2025-03-03 NOTE — TELEPHONE ENCOUNTER
Caller: Anshul Fournier    Doctor: Dr. Nathan    Reason for call: Anshul is calling to make sure his prescription was sent in to his pharmacy.  Can someone reach out to him?  Thamk you.     Call back#: [phone]

## 2025-03-03 NOTE — PROGRESS NOTES
[facility] Podiatric Medicine and Surgery Office Visit    ASSESSMENT     Diagnoses and all orders for this visit:    Hammertoe of second toe of right foot    Diabetic ulcer of toe of right foot associated with type 2 diabetes mellitus, with fat layer exposed (HCC)  -     Ambulatory referral to Podiatry  -     XR foot 3+ vw right; Future  -     cephalexin (KEFLEX) 500 mg capsule; Take 1 capsule (500 mg total) by mouth every 6 (six) hours for 7 days  -     Post Op Shoe    Type 2 diabetes mellitus without complication, without long-term current use of insulin (HCC)         Problem List Items Addressed This Visit          Endocrine    Type 2 diabetes mellitus without complication, without long-term current use of insulin (HCC)    Diabetic ulcer of toe of right foot associated with type 2 diabetes mellitus, with fat layer exposed (HCC)    Relevant Medications    cephalexin (KEFLEX) 500 mg capsule    Other Relevant Orders    XR foot 3+ vw right    Post Op Shoe     Other Visit Diagnoses         Hammertoe of second toe of right foot    -  Primary          PLAN  -The patient and I discussed their right foot  -Patient was educated on local (redness, swelling, warmth, white or malodorous drainage) and systemic (nausea, vomiting, fevers, chills, shortness of breath, chest pain, diaphoresis, fatigue) signs of infection.  They were instructed to go immediately to the emergency department should any of these present.  -Flexor tenotomy was performed to the right second digit as described below  -Wound dressed with Betadine, xeroform, offloading padding, DSD.  Patient was provided with these supplies and was instructed on home dressing changes.  -Rx Keflex  -Patient was instructed to wear their surgical shoe at all times.    X-ray of the right foot from 3/3/2025 was interpreted independently: Hammertoe and hallux malleus deformity noted as below.  No cortical erosion noted to the second digit distal phalanx to suggest osteomyelitis.   No soft tissue emphysema noted.    Flexor tenotomy procedure (CPT 80973)  Verbal consent was obtained. Timeout was called prior to procedure. Patient was positioned supine on the exam chair with the right extremity extended. The foot was cleansed with alcohol prep prior to injection. A digital block was then performed consisting of 5cc of 1% lidocaine plain. The digit was completely anesthetized. The plantar aspect of the right second digit was then cleansed with an alcohol prep pad followed by a betadine swab. A sharp 18g x 1in needle was then inserted into the plantar aspect of the digit. The flexor tendon was felt and was cut from medial to lateral until complete release was noted. Increased dorsiflexion at the PIPJ was noted indicating a successful tenotomy. The percutaneous needle incision site was dressed with a band-aid. The patient tolerated the procedure well with no immediate complications.  The remaining wound to the second digit was covered with Xeroform, 2 x 2 gauze, dry sterile dressing.    SUBJECTIVE    Chief Complaint:  Right second toe wound     Patient ID: Anshul Landers is a pleasant 59 year old male who presents with a right foot ulcer. He states that not too long ago he noticed his right 2nd toe was black. He mentions that about a year ago he started taking Jardiance and that's when he started to have issues with his feet. He states that he has tried cutting off the dead skin around the area. He also has noticed some puss coming out.   Fortunately he denies any systemic signs of infection.        The following portions of the patient's history were reviewed and updated as appropriate: allergies, current medications, past family history, past medical history, past social history, past surgical history and problem list.    Review of Systems   Constitutional:  Negative for appetite change, chills, diaphoresis, fatigue and fever.   HENT: Negative.     Gastrointestinal: Negative.    Skin:  " Positive for color change and wound.         OBJECTIVE      Ht 5' 10.75\" (1.797 m)   Wt 110 kg (242 lb)   BMI 33.99 kg/m²        Physical Exam  Constitutional:       Appearance: Normal appearance. He is not ill-appearing or diaphoretic.   HENT:      Head: Normocephalic and atraumatic.   Eyes:      General:         Right eye: No discharge.         Left eye: No discharge.   Cardiovascular:      Pulses:           Dorsalis pedis pulses are 2+ on the right side and 2+ on the left side.        Posterior tibial pulses are 2+ on the right side and 2+ on the left side.   Pulmonary:      Effort: Pulmonary effort is normal. No respiratory distress.   Feet:      Right foot:      Protective Sensation: 10 sites tested.  6 sites sensed.      Skin integrity: Ulcer and erythema present.      Left foot:      Protective Sensation: 10 sites tested.  6 sites sensed.   Skin:     Capillary Refill: Capillary refill takes less than 2 seconds.   Neurological:      Mental Status: He is alert.      Sensory: Sensory deficit present.   Psychiatric:         Mood and Affect: Mood normal.         Vascular:  -DP and PT pulses intact b/l  -Capillary refill time <2 sec b/l  -Digital hair growth: Present  -Skin temp: WNL    MSK:  -Pain on palpation negative  -Hallux malleus noted to the right hallux, rigid in nature with abduction component  -Flexible hammertoe deformity of the 2nd through 5th digits of the right foot noted with dorsiflexion at the metatarsophalangeal joint, plantarflexion at both the proximal interphalangeal and distal interphalangeal joints.  Again, the deformity is flexible in nature with at least partial reduction possible.  -MMT is 5/5 to all muscle compartments of the lower extremity  -Ankle dorsiflexion <10 degrees with knee extended and knee flexed b/l    Derm:  -No noted interdigital maceration, peeling, malodor  -No callus formation noted on exam    Wound #: 1  Location: Right second digit distally  Length 0.3 cm: Width " 0.3 cm: Depth 0.2 cm:   Deepest Tissue Noted in Base: Subcutaneous tissue  Probe to Bone: No  Peripheral Skin Description: Attached and hyperkeratotic , slightly erythematous and edematous  Surrounding temperature: Normal  Undermining/tunneling: None  Granulation: 100% Fibrotic Tissue: 0% Necrotic Tissue: 0%   Drainage Amount: minimal, serous, bloody  Signs of Infection: Yes

## 2025-03-11 ENCOUNTER — TELEPHONE (OUTPATIENT)
Age: 60
End: 2025-03-11

## 2025-03-11 ENCOUNTER — TELEPHONE (OUTPATIENT)
Dept: OBGYN CLINIC | Facility: CLINIC | Age: 60
End: 2025-03-11

## 2025-03-11 NOTE — TELEPHONE ENCOUNTER
Lm to reschedule due to dr flowers being pulled into OR on 3/12.  Asked patient to call and reschedule

## 2025-03-11 NOTE — TELEPHONE ENCOUNTER
Caller: Anshul Fournier    Doctor and/or Office: Dr. Nathan    #: [phone]    Escalation: Surgery Patient wants to know if he can stop wearing the surgical shoe yet? He is almost healed and his toes do not touch is normal shoes. He is also out of iodine and the medical wrap he was given. Please return call. Thank you

## 2025-03-12 ENCOUNTER — CLINICAL SUPPORT (OUTPATIENT)
Dept: FAMILY MEDICINE CLINIC | Facility: CLINIC | Age: 60
End: 2025-03-12
Payer: COMMERCIAL

## 2025-03-12 DIAGNOSIS — Z23 ENCOUNTER FOR IMMUNIZATION: Primary | ICD-10-CM

## 2025-03-12 PROCEDURE — 90750 HZV VACC RECOMBINANT IM: CPT

## 2025-03-12 PROCEDURE — 90471 IMMUNIZATION ADMIN: CPT

## 2025-03-13 ENCOUNTER — OFFICE VISIT (OUTPATIENT)
Age: 60
End: 2025-03-13

## 2025-03-13 VITALS — WEIGHT: 242 LBS | HEIGHT: 71 IN | BODY MASS INDEX: 33.88 KG/M2

## 2025-03-13 DIAGNOSIS — E11.621 DIABETIC ULCER OF TOE OF RIGHT FOOT ASSOCIATED WITH TYPE 2 DIABETES MELLITUS, WITH FAT LAYER EXPOSED (HCC): Primary | ICD-10-CM

## 2025-03-13 DIAGNOSIS — M20.41 HAMMERTOE OF SECOND TOE OF RIGHT FOOT: ICD-10-CM

## 2025-03-13 DIAGNOSIS — L97.512 DIABETIC ULCER OF TOE OF RIGHT FOOT ASSOCIATED WITH TYPE 2 DIABETES MELLITUS, WITH FAT LAYER EXPOSED (HCC): Primary | ICD-10-CM

## 2025-03-13 PROCEDURE — 99024 POSTOP FOLLOW-UP VISIT: CPT | Performed by: STUDENT IN AN ORGANIZED HEALTH CARE EDUCATION/TRAINING PROGRAM

## 2025-03-13 NOTE — PATIENT INSTRUCTIONS
Purchase a supportive pair of sneakers such as Triana, Hoka, Saucony, New Balance, On Cloud, Altra.  Some qualities to look for is that the shoe should bend only where the toes bend, the sole should not be too flimsy, it should have a wide toe box and a somewhat stiff heel support. Purchase a supportive over-the-counter pair of inserts such as Superfeet, powersteps, tread labs.    [employer]  [address] [facility] [address]  [phone]     [employer]  [address] [facility] [address], [place] [address]  [phone]     [employer]  [address] [place] [address] [phone]     [employer]   [address] [facility]  [phone]     [employer]  [address], [place], [place] [address] [phone]     [employer]  [address] [place], [place] [id number] [phone]     [facility] [employer] [facility]   [address] [place] [address]  [phone]     [employer]  [address] [place], [place]  [phone]     [employer] [address], [address]  [phone]     [employer]  [address] [place] [address], [address], [place] [address] [phone]

## 2025-03-13 NOTE — PROGRESS NOTES
[facility] Podiatric Medicine and Surgery Office Visit    ASSESSMENT     Diagnoses and all orders for this visit:    Diabetic ulcer of toe of right foot associated with type 2 diabetes mellitus, with fat layer exposed (HCC)    Hammertoe of second toe of right foot           Problem List Items Addressed This Visit          Endocrine    Diabetic ulcer of toe of right foot associated with type 2 diabetes mellitus, with fat layer exposed (HCC) - Primary     Other Visit Diagnoses         Hammertoe of second toe of right foot                PLAN  -The patient and I discussed their right foot  -The patient's ulceration as well as his flexor tenotomy site are fully healed at this time.  Second digit is in a much more rectus position compared to previous visit with no remaining hammertoe deformity noted  -He should continue to wear correctly sized, supportive sneakers with over-the-counter inserts.  We did discuss various brands, insert options, and reputable shoe stores around the area.  -Patient should continue self examining feet daily for signs of new wounds, calluses, or discoloration  -Return to clinic 4 weeks    SUBJECTIVE    Chief Complaint:  Right second toe wound     Patient ID: Anshul Landers is a pleasant 59 year old male who presents with a right foot ulcer. He states that not too long ago he noticed his right 2nd toe was black. He mentions that about a year ago he started taking Jardiance and that's when he started to have issues with his feet. He states that he has tried cutting off the dead skin around the area. He also has noticed some puss coming out.   Fortunately he denies any systemic signs of infection.    3/13/2025: Anshul is doing well today. He states that his second digit wound is fully healed and he has not noticed drainage from the area over the past few days.  He states that he has been performing dressing changes as instructed.  He denies any systemic signs of infection since his last  "visit.        The following portions of the patient's history were reviewed and updated as appropriate: allergies, current medications, past family history, past medical history, past social history, past surgical history and problem list.    Review of Systems   Constitutional:  Negative for appetite change, chills, diaphoresis, fatigue and fever.   HENT: Negative.     Gastrointestinal: Negative.    Skin:  Positive for color change and wound.         OBJECTIVE      Ht 5' 10.75\" (1.797 m)   Wt 110 kg (242 lb)   BMI 33.99 kg/m²        Physical Exam  Constitutional:       Appearance: Normal appearance. He is not ill-appearing or diaphoretic.   HENT:      Head: Normocephalic and atraumatic.   Eyes:      General:         Right eye: No discharge.         Left eye: No discharge.   Cardiovascular:      Pulses:           Dorsalis pedis pulses are 2+ on the right side and 2+ on the left side.        Posterior tibial pulses are 2+ on the right side and 2+ on the left side.   Pulmonary:      Effort: Pulmonary effort is normal. No respiratory distress.   Feet:      Right foot:      Protective Sensation: 10 sites tested.  6 sites sensed.      Skin integrity: Ulcer and erythema present.      Left foot:      Protective Sensation: 10 sites tested.  6 sites sensed.   Skin:     Capillary Refill: Capillary refill takes less than 2 seconds.   Neurological:      Mental Status: He is alert.      Sensory: Sensory deficit present.   Psychiatric:         Mood and Affect: Mood normal.         Vascular:  -DP and PT pulses intact b/l  -Capillary refill time <2 sec b/l  -Digital hair growth: Present  -Skin temp: WNL    MSK:  -Pain on palpation negative  -Hallux malleus noted to the right hallux, rigid in nature with abduction component  -Flexible hammertoe deformity of the 3rd through 5th digits of the right foot noted with dorsiflexion at the metatarsophalangeal joint, plantarflexion at both the proximal interphalangeal and distal " interphalangeal joints.  Again, the deformity is flexible in nature with at least partial reduction possible.  -MMT is 5/5 to all muscle compartments of the lower extremity  -Ankle dorsiflexion <10 degrees with knee extended and knee flexed b/l  -Second digit of the right foot sits in a rectus position today with no remaining plantarflexion at the level of the proximal to phalangeal joint.    Derm:  -No noted interdigital maceration, peeling, malodor  -No callus formation noted on exam  -Site of previous diabetic ulceration to the distal aspect of the right second digit is fully epithelialized today with no drainage or local signs of infection noted

## 2025-05-25 LAB
ALBUMIN SERPL-MCNC: 5.1 G/DL (ref 3.9–4.9)
ALBUMIN/CREAT UR: 27 MG/G CREAT (ref 0–29)
ALP SERPL-CCNC: 77 IU/L (ref 44–121)
ALT SERPL-CCNC: 33 IU/L (ref 0–44)
AST SERPL-CCNC: 20 IU/L (ref 0–40)
BILIRUB SERPL-MCNC: 0.5 MG/DL (ref 0–1.2)
BUN SERPL-MCNC: 17 MG/DL (ref 8–27)
BUN/CREAT SERPL: 25 (ref 10–24)
CALCIUM SERPL-MCNC: 10 MG/DL (ref 8.6–10.2)
CHLORIDE SERPL-SCNC: 100 MMOL/L (ref 96–106)
CHOLEST SERPL-MCNC: 140 MG/DL (ref 100–199)
CO2 SERPL-SCNC: 20 MMOL/L (ref 20–29)
CREAT SERPL-MCNC: 0.69 MG/DL (ref 0.76–1.27)
CREAT UR-MCNC: 107 MG/DL
EGFR: 105 ML/MIN/1.73
EST. AVERAGE GLUCOSE BLD GHB EST-MCNC: 169 MG/DL
GLOBULIN SER-MCNC: 2.1 G/DL (ref 1.5–4.5)
GLUCOSE SERPL-MCNC: 131 MG/DL (ref 70–99)
HBA1C MFR BLD: 7.5 % (ref 4.8–5.6)
HDLC SERPL-MCNC: 32 MG/DL
LDLC SERPL CALC-MCNC: 74 MG/DL (ref 0–99)
LDLC/HDLC SERPL: 2.3 RATIO (ref 0–3.6)
MICROALBUMIN UR-MCNC: 29.2 UG/ML
MICRODELETION SYND BLD/T FISH: NORMAL
POTASSIUM SERPL-SCNC: 4.7 MMOL/L (ref 3.5–5.2)
PROT SERPL-MCNC: 7.2 G/DL (ref 6–8.5)
SL AMB VLDL CHOLESTEROL CALC: 34 MG/DL (ref 5–40)
SODIUM SERPL-SCNC: 138 MMOL/L (ref 134–144)
TRIGL SERPL-MCNC: 200 MG/DL (ref 0–149)

## 2025-05-27 ENCOUNTER — OFFICE VISIT (OUTPATIENT)
Dept: FAMILY MEDICINE CLINIC | Facility: CLINIC | Age: 60
End: 2025-05-27
Payer: COMMERCIAL

## 2025-05-27 ENCOUNTER — TELEPHONE (OUTPATIENT)
Dept: ADMINISTRATIVE | Facility: OTHER | Age: 60
End: 2025-05-27

## 2025-05-27 VITALS
BODY MASS INDEX: 34.16 KG/M2 | WEIGHT: 244 LBS | RESPIRATION RATE: 16 BRPM | HEIGHT: 71 IN | TEMPERATURE: 97 F | SYSTOLIC BLOOD PRESSURE: 120 MMHG | DIASTOLIC BLOOD PRESSURE: 74 MMHG | HEART RATE: 84 BPM

## 2025-05-27 DIAGNOSIS — Z87.891 FORMER TOBACCO USE: ICD-10-CM

## 2025-05-27 DIAGNOSIS — E11.9 TYPE 2 DIABETES MELLITUS WITHOUT COMPLICATION, WITHOUT LONG-TERM CURRENT USE OF INSULIN (HCC): ICD-10-CM

## 2025-05-27 DIAGNOSIS — E78.2 MIXED HYPERLIPIDEMIA: Primary | ICD-10-CM

## 2025-05-27 PROCEDURE — 99213 OFFICE O/P EST LOW 20 MIN: CPT | Performed by: NURSE PRACTITIONER

## 2025-05-27 NOTE — ASSESSMENT & PLAN NOTE
Last CT was January 2024.  Orders:  •  CT Lung Screening Program; Future   Transposition Flap Text: The defect edges were debeveled with a #15 scalpel blade.  Given the location of the defect and the proximity to free margins a transposition flap was deemed most appropriate.  Using a sterile surgical marker, an appropriate transposition flap was drawn incorporating the defect.    The area thus outlined was incised deep to adipose tissue with a #15 scalpel blade.  The skin margins were undermined to an appropriate distance in all directions utilizing iris scissors.

## 2025-05-27 NOTE — ASSESSMENT & PLAN NOTE
Stable, he will continue to work on his diet.  Repeat labs in 3 months     Lab Results   Component Value Date    HGBA1C 7.5 (H) 05/22/2025       Orders:  •  Empagliflozin (Jardiance) 25 MG TABS; Take 1 tablet (25 mg total) by mouth in the morning  •  Comprehensive metabolic panel; Future  •  Hemoglobin A1C; Future  •  Lipid Panel with Direct LDL reflex; Future

## 2025-05-27 NOTE — PROGRESS NOTES
"Name: Anshul Fournier      : 1965      MRN: [id number]  Encounter Provider: IRMA Bustillo  Encounter Date: 2025   Encounter department: [facility]  :  Assessment & Plan  Type 2 diabetes mellitus without complication, without long-term current use of insulin (HCC)    Stable, he will continue to work on his diet.  Repeat labs in 3 months     Lab Results   Component Value Date    HGBA1C 7.5 (H) 2025       Orders:  •  Empagliflozin (Jardiance) 25 MG TABS; Take 1 tablet (25 mg total) by mouth in the morning  •  Comprehensive metabolic panel; Future  •  Hemoglobin A1C; Future  •  Lipid Panel with Direct LDL reflex; Future    Mixed hyperlipidemia  Stable on Crestor        Former tobacco use  Last CT was 2024.  Orders:  •  CT Lung Screening Program; Future           History of Present Illness   Here today for follow up, labs prior.  No concerns today.  Seeing an orthopedist for his hip        Review of Systems   Constitutional: Negative.    Respiratory: Negative.     Cardiovascular: Negative.    Gastrointestinal: Negative.    Neurological: Negative.        Objective   /74   Pulse 84   Temp (!) 97 °F (36.1 °C)   Resp 16   Ht 5' 10.75\" (1.797 m)   Wt 111 kg (244 lb)   BMI 34.27 kg/m²      Physical Exam  Vitals and nursing note reviewed.   Constitutional:       General: He is not in acute distress.     Appearance: Normal appearance.   HENT:      Head: Normocephalic and atraumatic.     Eyes:      Conjunctiva/sclera: Conjunctivae normal.     Neck:      Vascular: No carotid bruit.     Cardiovascular:      Rate and Rhythm: Normal rate and regular rhythm.      Pulses: Normal pulses.      Heart sounds: Normal heart sounds. No murmur heard.  Pulmonary:      Effort: Pulmonary effort is normal.      Breath sounds: Normal breath sounds.     Skin:     General: Skin is warm and dry.     Neurological:      Mental Status: He is alert.     Psychiatric:         Mood and Affect: " Mood normal.         Behavior: Behavior normal.

## 2025-05-27 NOTE — TELEPHONE ENCOUNTER
----- Message from IRMA Bustillo sent at 5/27/2025  9:11 AM EDT -----  Regarding: eye exam  05/27/25 9:11 Kennedy, our patient No patient name on file. has had Diabetic Eye Exam completed/performed. Please assist in updating the patient chart by making an External outreach to Dr. Ray facility located in [place]. The date of service is 2025.Thank you,[facility]

## 2025-05-27 NOTE — LETTER
Diabetic Eye Exam Form    Date Requested: 25  Patient: Anshul Fournier  Patient : 1965   Referring Provider: IRMA Bustillo      DIABETIC Eye Exam Date _______________________________      Type of Exam MUST be documented for Diabetic Eye Exams. Please CHECK ONE.     Retinal Exam       Dilated Retinal Exam       OCT       Optomap-Iris Exam      Fundus Photography       Left Eye - Please check Retinopathy or No Retinopathy        Exam did show retinopathy    Exam did not show retinopathy       Right Eye - Please check Retinopathy or No Retinopathy       Exam did show retinopathy    Exam did not show retinopathy       Comments __________________________________________________________    Practice Providing Exam ______________________________________________    Exam Performed By (print name) _______________________________________      Provider Signature ___________________________________________________      These reports are needed for  compliance.    Please fax this completed form and a copy of the Diabetic Eye Exam report to the [facility] Based Department as soon as possible via Fax [phone], attention [place]: Phone [phone]. Our office is located at [address] [place] [address].     We thank you for your assistance in treating our mutual patient.   Dr. Ray - [phone] F [phone]     Dr. Salas,    Was this patient supposed to have a Pain Mgmt referral?

## 2025-05-29 DIAGNOSIS — E11.9 TYPE 2 DIABETES MELLITUS WITHOUT COMPLICATION, WITHOUT LONG-TERM CURRENT USE OF INSULIN (HCC): ICD-10-CM

## 2025-06-05 ENCOUNTER — RESULTS FOLLOW-UP (OUTPATIENT)
Dept: FAMILY MEDICINE CLINIC | Facility: CLINIC | Age: 60
End: 2025-06-05

## 2025-06-09 NOTE — TELEPHONE ENCOUNTER
Upon review of the In Basket request and the patient's chart, initial outreach has been made via fax to facility. Please see Contacts section for details.     Thank you  Luke Ball MA

## 2025-06-10 NOTE — TELEPHONE ENCOUNTER
Upon review of the In Basket request we were able to locate, review, and update the patient chart as requested for Diabetic Eye Exam.    Any additional questions or concerns should be emailed to the Practice Liaisons via the appropriate education email address, please do not reply via In Basket.    Thank you  Luke Ball MA   [facility]

## 2025-06-12 ENCOUNTER — TELEPHONE (OUTPATIENT)
Age: 60
End: 2025-06-12

## 2025-06-12 NOTE — TELEPHONE ENCOUNTER
I am okay to help out but unsure if this is enough for direct referral or if an order needs to be placed  If patient needs to be seen first I will gladly see them in consult and okay to double book for a quick appointment whenever works for patient    Please advise and assist either way  Thank you

## 2025-06-12 NOTE — TELEPHONE ENCOUNTER
Caller: pt    Doctor: Dr. redd    Reason for call: pt would like to get a hip injection.    Call back#: [phone]

## 2025-06-12 NOTE — TELEPHONE ENCOUNTER
Caller: heydi Landers    Doctor: Dr. Vega    Reason for call: returning schedulers call.     Call back#: [phone]

## 2025-06-20 ENCOUNTER — CONSULT (OUTPATIENT)
Dept: PAIN MEDICINE | Facility: CLINIC | Age: 60
End: 2025-06-20
Payer: COMMERCIAL

## 2025-06-20 VITALS
SYSTOLIC BLOOD PRESSURE: 139 MMHG | DIASTOLIC BLOOD PRESSURE: 80 MMHG | HEIGHT: 70 IN | BODY MASS INDEX: 34.65 KG/M2 | HEART RATE: 79 BPM | WEIGHT: 242 LBS

## 2025-06-20 DIAGNOSIS — M70.61 TROCHANTERIC BURSITIS OF RIGHT HIP: ICD-10-CM

## 2025-06-20 DIAGNOSIS — E11.9 TYPE 2 DIABETES MELLITUS WITHOUT COMPLICATION, WITHOUT LONG-TERM CURRENT USE OF INSULIN (HCC): ICD-10-CM

## 2025-06-20 DIAGNOSIS — M16.11 ARTHRITIS OF RIGHT HIP: Primary | ICD-10-CM

## 2025-06-20 PROCEDURE — 99244 OFF/OP CNSLTJ NEW/EST MOD 40: CPT | Performed by: PHYSICAL MEDICINE & REHABILITATION

## 2025-06-20 RX ORDER — LANOLIN ALCOHOL/MO/W.PET/CERES
1000 CREAM (GRAM) TOPICAL DAILY
COMMUNITY

## 2025-06-20 RX ORDER — AMPICILLIN TRIHYDRATE 250 MG
500 CAPSULE ORAL DAILY
COMMUNITY

## 2025-06-20 NOTE — ASSESSMENT & PLAN NOTE
Lab Results   Component Value Date    HGBA1C 7.5 (H) 05/22/2025       Orders:    FL spine and pain procedure; Future

## 2025-06-20 NOTE — PROGRESS NOTES
Name: Anshul Fournier      : 1965      MRN: [id number]  Encounter Provider: Fernandez Vega DO  Encounter Date: 2025   Encounter department: [facility]  :  Assessment & Plan  Arthritis of right hip    Orders:    [place] spine and pain procedure; Future    Type 2 diabetes mellitus without complication, without long-term current use of insulin ([facility])    Lab Results   Component Value Date    HGBA1C 7.5 (H) 2025       Orders:    [place] spine and pain procedure; Future    Trochanteric bursitis of right hip    Orders:    [place] spine and pain procedure; Future      Mr. Fournier is a pleasant 60-year-old male significant past medical history of type 2 diabetes, major depressive disorder presents to [facility] for initial evaluation regarding ongoing and worsening right hip pain.  During today's evaluation he is demonstrating clinical and diagnostic evidence of hip arthritic changes and greater trochanteric bursitis.  He does have a cyst on the right hip that is being watched and managed with orthopedic oncology with most recent visit stating injections and therapy would be an an option at this point to manage his ongoing pain.  As such we will plan for a right-sided intra-articular hip and greater trochanteric bursa steroid injections under fluoroscopy guidance.  All questions answered, patient is agreeable with plan.    Complete risks and benefits including bleeding, infection, tissue reaction, nerve injury and allergic reaction were discussed. The approach was demonstrated using models and literature was provided. Verbal and written consent was obtained.    My impressions and treatment recommendations were discussed in detail with the patient who verbalized understanding and had no further questions.  Discharge instructions were provided. I personally saw and examined the patient and I agree with the above discussed plan of care.    History of Present Illness      Anshul Fournier is a 60 y.o. male presents to [facility] for initial evaluation regarding worsening right sided hip pain.  Denies any significant inciting event or recent trauma.  Today reports severe pain rated 10 out of 10 and interfere with activities.  Pain is constant 100% of the time described as sharp, throbbing, pressure-like sensation.  Also reports lower extremity weakness but denies any recent falls.  Does not use any durable medical equipment for ambulation.  Symptoms are worse with bending, exercise, lifting.  Reports minimal relief with medication management but does report relief with injections in the past.  Presents today for initial evaluation.  Of note he was a direct referral in the past for a left hip injection which was performed February 7, 2020 for.  Presents for initial evaluation now regarding the right hip    Review of Systems   Constitutional:  Negative for fever and unexpected weight change.   HENT:  Negative for trouble swallowing.    Eyes:  Negative for visual disturbance.   Respiratory:  Negative for shortness of breath and wheezing.    Cardiovascular:  Negative for chest pain and palpitations.   Gastrointestinal:  Negative for constipation, diarrhea, nausea and vomiting.   Endocrine: Negative for cold intolerance, heat intolerance and polydipsia.   Genitourinary:  Negative for difficulty urinating and frequency.   Musculoskeletal:  Negative for arthralgias, gait problem, joint swelling and myalgias.        Right hip pain   Skin:  Negative for rash.   Neurological:  Negative for dizziness, seizures, syncope, weakness and headaches.   Hematological:  Does not bruise/bleed easily.   Psychiatric/Behavioral:  Negative for dysphoric mood.    All other systems reviewed and are negative.    Medical History Reviewed by provider this encounter:     .     Objective   /80 (BP Location: Left arm, Patient Position: Sitting, Cuff Size: Standard)   Pulse 79   Ht  "5' 10\" (1.778 m)   Wt 110 kg (242 lb)   BMI 34.72 kg/m²      Pain Score:   5  Physical Exam  Constitutional: normal, well developed, well nourished, alert, in no distress and non-toxic and no overt pain behavior.  Eyes: anicteric  HEENT: grossly intact  Neck: supple, symmetric, trachea midline and no masses   Pulmonary: even and unlabored  Cardiovascular: No edema or pitting edema present  Skin: Normal without rashes or lesions and well hydrated  Psychiatric: Mood and affect appropriate  Neurologic: Cranial Nerves II-XII grossly intact  Musculoskeletal: antalgic, tenderness to palpation right sided lateral hip and bursa, MMT 5 out of 5 bilateral lower extremities, sensation grossly intact to light touch, positive scour right-sided, negative straight leg raise right-sided    "

## 2025-06-24 ENCOUNTER — HOSPITAL ENCOUNTER (OUTPATIENT)
Dept: RADIOLOGY | Facility: CLINIC | Age: 60
Discharge: HOME | End: 2025-06-24
Attending: PHYSICAL MEDICINE & REHABILITATION | Admitting: PHYSICAL MEDICINE & REHABILITATION
Payer: COMMERCIAL

## 2025-06-24 VITALS
TEMPERATURE: 98.4 F | HEART RATE: 78 BPM | RESPIRATION RATE: 18 BRPM | DIASTOLIC BLOOD PRESSURE: 79 MMHG | SYSTOLIC BLOOD PRESSURE: 122 MMHG | OXYGEN SATURATION: 95 %

## 2025-06-24 DIAGNOSIS — M70.61 TROCHANTERIC BURSITIS OF RIGHT HIP: ICD-10-CM

## 2025-06-24 DIAGNOSIS — M16.11 ARTHRITIS OF RIGHT HIP: ICD-10-CM

## 2025-06-24 DIAGNOSIS — E11.9 TYPE 2 DIABETES MELLITUS WITHOUT COMPLICATION, WITHOUT LONG-TERM CURRENT USE OF INSULIN (HCC): ICD-10-CM

## 2025-06-24 PROCEDURE — 77002 NEEDLE LOCALIZATION BY XRAY: CPT | Performed by: PHYSICAL MEDICINE & REHABILITATION

## 2025-06-24 PROCEDURE — 20610 DRAIN/INJ JOINT/BURSA W/O US: CPT | Performed by: PHYSICAL MEDICINE & REHABILITATION

## 2025-06-24 PROCEDURE — 77002 NEEDLE LOCALIZATION BY XRAY: CPT

## 2025-06-24 RX ORDER — METHYLPREDNISOLONE ACETATE 80 MG/ML
80 INJECTION, SUSPENSION INTRA-ARTICULAR; INTRALESIONAL; INTRAMUSCULAR; PARENTERAL; SOFT TISSUE ONCE
Status: COMPLETED | OUTPATIENT
Start: 2025-06-24 | End: 2025-06-24

## 2025-06-24 RX ORDER — ROPIVACAINE HYDROCHLORIDE 2 MG/ML
5 INJECTION, SOLUTION EPIDURAL; INFILTRATION; PERINEURAL ONCE
Status: COMPLETED | OUTPATIENT
Start: 2025-06-24 | End: 2025-06-24

## 2025-06-24 RX ADMIN — METHYLPREDNISOLONE ACETATE 80 MG: 80 INJECTION, SUSPENSION INTRA-ARTICULAR; INTRALESIONAL; INTRAMUSCULAR; SOFT TISSUE at 10:16

## 2025-06-24 RX ADMIN — ROPIVACAINE HYDROCHLORIDE 5 ML: 2 INJECTION, SOLUTION EPIDURAL; INFILTRATION at 10:16

## 2025-06-24 RX ADMIN — IOHEXOL 1 ML: 300 INJECTION, SOLUTION INTRAVENOUS at 10:16

## 2025-06-24 NOTE — DISCHARGE INSTR - LAB

## 2025-06-24 NOTE — H&P
History of Present Illness: The patient is a 60 y.o. male who presents with complaints of right hip pain    Past Medical History[1]    Past Surgical History[2]    Current Medications[3]    Allergies[4]    Physical Exam: There were no vitals filed for this visit.  General: Awake, Alert, Oriented x 3, Mood and affect appropriate  Respiratory: Respirations even and unlabored  Cardiovascular: Peripheral pulses intact; no edema  Musculoskeletal Exam: tenderness to palpation right hip    ASA Score: 2    Patient/Chart Verification  Patient ID Verified: Verbal  ID Band Applied: No  Consents Confirmed: To be obtained in the Procedural area  H&P( within 30 days) Verified: Yes  Interval H&P(within 24 hr) Complete (required for Outpatients and Surgery Admit only): Yes  Allergies Reviewed: Yes  Anticoag/NSAID held?: NA  Currently on antibiotics?: No    Assessment:   1. Arthritis of right hip    2. Type 2 diabetes mellitus without complication, without long-term current use of insulin ([facility])    3. Trochanteric bursitis of right hip        Plan: Right Hip and GTB Inj        [1]   Past Medical History:  Diagnosis Date    Diabetes mellitus (HCC)     Hyperlipidemia    [2]   Past Surgical History:  Procedure Laterality Date    HERNIA REPAIR Left 4/11/2021    Procedure: REPAIR HERNIA INGUINAL, umbilical hernia repair;  Surgeon: Virgie Rowland MD;  Location: [facility] MAIN OR;  Service: General    PALATE / UVULA BIOPSY / EXCISION      AR ARTHROCENTESIS ASPIR&/INJ MAJOR JT/BURSA W/O  Left 4/5/2023    Procedure: intra articular hip joint injection ([id number]);  Surgeon: Fernandez Vega DO;  Location: [facility] MAIN OR;  Service: Pain Management     AR ARTHROCENTESIS ASPIR&/INJ MAJOR JT/BURSA W/O  Left 2/7/2024    Procedure: LEFT INTRA-ARTICULAR HIP INJECTION;  Surgeon: Fernandez Vega DO;  Location: [facility] MAIN OR;  Service: Pain Management     TONSILLECTOMY AND ADENOIDECTOMY      VASECTOMY     [3]   Current Outpatient Medications:      Cinnamon 500 MG capsule, Take 500 mg by mouth daily, Disp: , Rfl:     Empagliflozin (Jardiance) 25 MG TABS, Take 1 tablet (25 mg total) by mouth in the morning, Disp: 90 tablet, Rfl: 1    metFORMIN (GLUCOPHAGE) 500 mg tablet, Take 2 tablets (1,000 mg total) by mouth 2 (two) times a day with meals, Disp: 360 tablet, Rfl: 1    Multiple Vitamin (multivitamin) tablet, Take 1 tablet by mouth daily, Disp: , Rfl:     rosuvastatin (CRESTOR) 10 MG tablet, Take 1 tablet (10 mg total) by mouth daily, Disp: 90 tablet, Rfl: 1    vitamin B-12 (VITAMIN B-12) 1,000 mcg tablet, Take 1,000 mcg by mouth daily, Disp: , Rfl:     zinc gluconate 50 mg tablet, Take 50 mg by mouth in the morning., Disp: , Rfl:     Current Facility-Administered Medications:     iohexol (OMNIPAQUE) 300 mg/mL injection 1 mL, 1 mL, Intra-articular, Once, Fernandez Vega DO    methylPREDNISolone acetate (DEPO-MEDROL) injection 80 mg, 80 mg, Intra-articular, Once, Fernandez Vega DO    ropivacaine (NAROPIN) injection 5 mL, 5 mL, Intra-articular, Once, Fernandez Vega DO  [4]   Allergies  Allergen Reactions    Indocin [Indomethacin] Vomiting

## 2025-07-08 ENCOUNTER — TELEPHONE (OUTPATIENT)
Dept: PAIN MEDICINE | Facility: CLINIC | Age: 60
End: 2025-07-08

## 2025-07-19 DIAGNOSIS — E78.2 MIXED HYPERLIPIDEMIA: ICD-10-CM

## 2025-07-20 ENCOUNTER — OFFICE VISIT (OUTPATIENT)
Dept: URGENT CARE | Facility: CLINIC | Age: 60
End: 2025-07-20
Payer: COMMERCIAL

## 2025-07-20 VITALS
TEMPERATURE: 97 F | HEIGHT: 70 IN | RESPIRATION RATE: 20 BRPM | DIASTOLIC BLOOD PRESSURE: 62 MMHG | OXYGEN SATURATION: 96 % | HEART RATE: 94 BPM | BODY MASS INDEX: 33.64 KG/M2 | WEIGHT: 235 LBS | SYSTOLIC BLOOD PRESSURE: 118 MMHG

## 2025-07-20 DIAGNOSIS — U07.1 COVID-19: Primary | ICD-10-CM

## 2025-07-20 PROCEDURE — 99213 OFFICE O/P EST LOW 20 MIN: CPT | Performed by: PHYSICIAN ASSISTANT

## 2025-07-20 RX ORDER — ROSUVASTATIN CALCIUM 10 MG/1
10 TABLET, COATED ORAL DAILY
Qty: 90 TABLET | Refills: 1 | Status: SHIPPED | OUTPATIENT
Start: 2025-07-20

## 2025-07-20 NOTE — PATIENT INSTRUCTIONS
COVID-19   -The patient appears stable today.   -Antivirals discussed. He has declined Paxlovid today.   -Quarantine guidelines discussed as per CDC. No fever for at least 24 hours without the use of fever reducing medications. Must have an improvement of your symptoms.   -Stay well hydrated and rested. Monitor hydration status. Push fluids.  -Vitamin C, D and zinc daily.   -Use hypertonic saline twice a day intranasal   -Neti-pot or nasal rinses for sinus pressure  -Run a humidifier next to your bed and take steam showers  -Tylenol for fever, chills, body aches.  -Mucinex or OTC cold medications for symptomatic relief. OTC supportive measures discussed  -Warm salt water gargles and tea with honey for sore throat  -Obtain a pulse ox and monitor your oxygen 2-3 times a day. You want your O2 to be >93%. If your <93% you need to go to the ED immediately.  -Will follow up in 2-3 days. If you experience worsening/red flag symptoms like shortness of breath, worsening shortness of breath, confusion, lethargy, chest pains, dizziness, weakness go to the ED or follow up immediately for evaluation.     I discussed the diagnosis of COVID-19 and the implications surrounding symptomatology and management. We concluded antibiotics were unnecessary at this point, given the viral nature.  I emphasized hydration and recognized existing treatment methods such as Zyrtec and Flonase, adding recommendations for over-the-counter symptom relief medications like Advil and Tylenol. Discourse on healthcare precautions was held, ensuring self-isolation. Patient consented to treatments with an understanding to reach out should symptoms persist or worsen

## 2025-07-20 NOTE — PROGRESS NOTES
[facility]        NAME: Anshul Fournier is a 60 y.o. male  : 1965    MRN: [id number]  DATE: 2025  TIME: 1:27 PM    Assessment and Plan   COVID-19 [U07.1]  1. COVID-19              Patient Instructions     COVID-19   -The patient appears stable today.   -Antivirals discussed. He has declined Paxlovid today.   -Quarantine guidelines discussed as per CDC. No fever for at least 24 hours without the use of fever reducing medications. Must have an improvement of your symptoms.   -Stay well hydrated and rested. Monitor hydration status. Push fluids.  -Vitamin C, D and zinc daily.   -Use hypertonic saline twice a day intranasal   -Neti-pot or nasal rinses for sinus pressure  -Run a humidifier next to your bed and take steam showers  -Tylenol for fever, chills, body aches.  -Mucinex or OTC cold medications for symptomatic relief. OTC supportive measures discussed  -Warm salt water gargles and tea with honey for sore throat  -Obtain a pulse ox and monitor your oxygen 2-3 times a day. You want your O2 to be >93%. If your <93% you need to go to the ED immediately.  -Will follow up in 2-3 days. If you experience worsening/red flag symptoms like shortness of breath, worsening shortness of breath, confusion, lethargy, chest pains, dizziness, weakness go to the ED or follow up immediately for evaluation.     I discussed the diagnosis of COVID-19 and the implications surrounding symptomatology and management. We concluded antibiotics were unnecessary at this point, given the viral nature.  I emphasized hydration and recognized existing treatment methods such as Zyrtec and Flonase, adding recommendations for over-the-counter symptom relief medications like Advil and Tylenol. Discourse on healthcare precautions was held, ensuring self-isolation. Patient consented to treatments with an understanding to reach out should symptoms persist or worsen        Follow up with PCP in 3-5 days.  Proceed to  ER if symptoms  worsen.    If tests have been performed at [facility], our office will contact you with results if changes need to be made to the care plan discussed with you at the visit.  You can review your full results on [facility] MyChart.    Chief Complaint     Chief Complaint   Patient presents with    covid  positive     Covid positive after trip           History of Present Illness       The patient is a 60-year-old type 2 DM, HLD, depression who presents today for COVID 19. The patients sx began three days ago with fatigue, he was flying home from [place] when his sx began. He then began to experience congestion, sore throat, dry coughing. He tested positive today via home antigen testing.No fever, chills, body aches. No dyspnea, wheezing, chest tightness, cp, palpitations. No dizziness or weakness. No nausea, vomiting, diarrhea, constipation, abdominal pain.   No stridor or drooling. No rash. No sweats or diaphoresis. No muffled voice.  Good PO intake. No loss of taste or smell. No lower extremity edema. No hx of asthma. He is a former smoker.         Review of Systems   Review of Systems   Constitutional:  Positive for fatigue. Negative for activity change, appetite change, chills, diaphoresis and fever.   HENT:  Positive for congestion, rhinorrhea and sore throat. Negative for ear discharge, ear pain, facial swelling, hearing loss, postnasal drip, sinus pressure, sinus pain, tinnitus, trouble swallowing and voice change.    Eyes:  Negative for visual disturbance.   Respiratory:  Positive for cough. Negative for apnea, chest tightness, shortness of breath, wheezing and stridor.    Cardiovascular:  Negative for chest pain, palpitations and leg swelling.   Gastrointestinal:  Negative for abdominal distention and abdominal pain.   Genitourinary:  Negative for decreased urine volume.   Musculoskeletal:  Negative for arthralgias, joint swelling, myalgias, neck pain and neck stiffness.   Skin:  Negative for rash.  "  Allergic/Immunologic: Negative for immunocompromised state.   Neurological:  Negative for dizziness, weakness, light-headedness, numbness and headaches.   Hematological:  Negative for adenopathy.         Current Medications     Current Medications[1]    Current Allergies     Allergies as of 07/20/2025    (No Known Allergies)            The following portions of the patient's history were reviewed and updated as appropriate: allergies, current medications, past family history, past medical history, past social history, past surgical history and problem list.     Past Medical History[2]    Past Surgical History[3]    Family History[4]      Medications have been verified.        Objective   /62   Pulse 94   Temp (!) 97 °F (36.1 °C)   Resp 20   Ht 5' 10\" (1.778 m)   Wt 107 kg (235 lb)   SpO2 96%   BMI 33.72 kg/m²   No LMP for male patient.       Physical Exam     Physical Exam  Vitals and nursing note reviewed.   Constitutional:       General: He is not in acute distress.     Appearance: He is well-developed. He is not ill-appearing, toxic-appearing or diaphoretic.   HENT:      Head: Normocephalic and atraumatic.      Right Ear: Hearing, tympanic membrane, ear canal and external ear normal.      Left Ear: Hearing, tympanic membrane, ear canal and external ear normal.      Nose: Nose normal. No mucosal edema or rhinorrhea.      Right Sinus: No maxillary sinus tenderness or frontal sinus tenderness.      Left Sinus: No maxillary sinus tenderness or frontal sinus tenderness.      Mouth/Throat:      Pharynx: Uvula midline. No oropharyngeal exudate, posterior oropharyngeal erythema or uvula swelling.      Tonsils: No tonsillar abscesses.     Cardiovascular:      Rate and Rhythm: Normal rate and regular rhythm.      Heart sounds: S1 normal and S2 normal. Heart sounds not distant. No murmur heard.     No friction rub. No gallop. No S3 or S4 sounds.   Pulmonary:      Effort: No tachypnea, bradypnea, accessory " muscle usage or respiratory distress.      Breath sounds: No decreased breath sounds, wheezing, rhonchi or rales.     Musculoskeletal:      Cervical back: Normal range of motion and neck supple.   Lymphadenopathy:      Cervical: No cervical adenopathy.     Neurological:      Mental Status: He is alert and oriented to person, place, and time.     Psychiatric:         Behavior: Behavior normal.                        [1]   Current Outpatient Medications:     Cinnamon 500 MG capsule, Take 500 mg by mouth daily, Disp: , Rfl:     Empagliflozin (Jardiance) 25 MG TABS, Take 1 tablet (25 mg total) by mouth in the morning, Disp: 90 tablet, Rfl: 1    metFORMIN (GLUCOPHAGE) 500 mg tablet, Take 2 tablets (1,000 mg total) by mouth 2 (two) times a day with meals, Disp: 360 tablet, Rfl: 1    Multiple Vitamin (multivitamin) tablet, Take 1 tablet by mouth daily, Disp: , Rfl:     rosuvastatin (CRESTOR) 10 MG tablet, Take 1 tablet (10 mg total) by mouth daily, Disp: 90 tablet, Rfl: 1    zinc gluconate 50 mg tablet, Take 50 mg by mouth in the morning., Disp: , Rfl:     vitamin B-12 (VITAMIN B-12) 1,000 mcg tablet, Take 1,000 mcg by mouth daily (Patient not taking: Reported on 7/20/2025), Disp: , Rfl:   [2]   Past Medical History:  Diagnosis Date    Diabetes mellitus (HCC)     Hyperlipidemia    [3]   Past Surgical History:  Procedure Laterality Date    HERNIA REPAIR Left 4/11/2021    Procedure: REPAIR HERNIA INGUINAL, umbilical hernia repair;  Surgeon: Virgie Rowland MD;  Location: [facility] OR;  Service: General    PALATE / UVULA BIOPSY / EXCISION      ND ARTHROCENTESIS ASPIR&/INJ MAJOR JT/BURSA W/O US Left 4/5/2023    Procedure: intra articular hip joint injection ([id number]);  Surgeon: Fernandez Vega DO;  Location: [facility] OR;  Service: Pain Management     ND ARTHROCENTESIS ASPIR&/INJ MAJOR JT/BURSA W/O US Left 2/7/2024    Procedure: LEFT INTRA-ARTICULAR HIP INJECTION;  Surgeon: Fernandez Vega DO;  Location: [facility] MAIN  OR;  Service: Pain Management     TONSILLECTOMY AND ADENOIDECTOMY      VASECTOMY     [4]   Family History  Problem Relation Name Age of Onset    Diabetes Father Dad

## 2025-07-21 ENCOUNTER — TELEPHONE (OUTPATIENT)
Age: 60
End: 2025-07-21

## 2025-07-21 NOTE — TELEPHONE ENCOUNTER
Requesting call back from Primary Care Provider regarding testing positive for covid  for him and his wife.  Currently denies symptoms. Seen in urgent care and declined paxlovid.  Please advise.

## 2025-07-21 NOTE — TELEPHONE ENCOUNTER
Patient called back stating he received a message and he would like to call back once tito is in next week. Patient stated they do not want to see any other provider.    Please advise, thank you

## 2025-07-21 NOTE — TELEPHONE ENCOUNTER
Called patient he doesn't want to talk to anyone else beside Tamera patient was advise Tamera is not in today he was very upset and hanged up the phone. CMA,

## 2025-07-21 NOTE — TELEPHONE ENCOUNTER
Call patient and ask for concerns and if having issues then needs to be seen in office. IRMA Diego

## 2025-07-22 NOTE — TELEPHONE ENCOUNTER
Pt and his wife still are tired but improving  Declined to see anyone else.  Pt requesting a message be sent to Tamera LIANG as an FYI  [id number]

## 2025-07-28 ENCOUNTER — PATIENT MESSAGE (OUTPATIENT)
Dept: FAMILY MEDICINE CLINIC | Facility: CLINIC | Age: 60
End: 2025-07-28

## 2025-07-29 ENCOUNTER — OFFICE VISIT (OUTPATIENT)
Dept: PAIN MEDICINE | Facility: CLINIC | Age: 60
End: 2025-07-29
Payer: COMMERCIAL

## 2025-07-29 VITALS — HEIGHT: 70 IN | WEIGHT: 235.89 LBS | BODY MASS INDEX: 33.77 KG/M2

## 2025-07-29 DIAGNOSIS — M70.61 TROCHANTERIC BURSITIS OF RIGHT HIP: Primary | ICD-10-CM

## 2025-07-29 DIAGNOSIS — M16.11 ARTHRITIS OF RIGHT HIP: ICD-10-CM

## 2025-07-29 PROCEDURE — 99213 OFFICE O/P EST LOW 20 MIN: CPT | Performed by: NURSE PRACTITIONER

## 2025-08-22 LAB
ALBUMIN SERPL-MCNC: 4.8 G/DL (ref 3.9–4.9)
ALP SERPL-CCNC: 72 IU/L (ref 44–121)
ALT SERPL-CCNC: 31 IU/L (ref 0–44)
AST SERPL-CCNC: 22 IU/L (ref 0–40)
BILIRUB SERPL-MCNC: 0.6 MG/DL (ref 0–1.2)
BUN SERPL-MCNC: 17 MG/DL (ref 8–27)
BUN/CREAT SERPL: 26 (ref 10–24)
CALCIUM SERPL-MCNC: 9.5 MG/DL (ref 8.6–10.2)
CHLORIDE SERPL-SCNC: 100 MMOL/L (ref 96–106)
CHOLEST SERPL-MCNC: 107 MG/DL (ref 100–199)
CO2 SERPL-SCNC: 20 MMOL/L (ref 20–29)
CREAT SERPL-MCNC: 0.65 MG/DL (ref 0.76–1.27)
EGFR: 107 ML/MIN/1.73
EST. AVERAGE GLUCOSE BLD GHB EST-MCNC: 143 MG/DL
GLOBULIN SER-MCNC: 1.8 G/DL (ref 1.5–4.5)
GLUCOSE SERPL-MCNC: 115 MG/DL (ref 70–99)
HBA1C MFR BLD: 6.6 % (ref 4.8–5.6)
HDLC SERPL-MCNC: 34 MG/DL
LDLC SERPL CALC-MCNC: 54 MG/DL (ref 0–99)
LDLC/HDLC SERPL: 1.6 RATIO (ref 0–3.6)
MICRODELETION SYND BLD/T FISH: NORMAL
POTASSIUM SERPL-SCNC: 4.5 MMOL/L (ref 3.5–5.2)
PROT SERPL-MCNC: 6.6 G/DL (ref 6–8.5)
SL AMB VLDL CHOLESTEROL CALC: 19 MG/DL (ref 5–40)
SODIUM SERPL-SCNC: 139 MMOL/L (ref 134–144)
TRIGL SERPL-MCNC: 99 MG/DL (ref 0–149)

## (undated) DEVICE — SYRINGE 10ML LL

## (undated) DEVICE — GLOVE SRG BIOGEL 7.5

## (undated) DEVICE — GLOVE SRG BIOGEL ECLIPSE 6

## (undated) DEVICE — TOWEL SET X-RAY

## (undated) DEVICE — SYRINGE 5ML LL

## (undated) DEVICE — PLUMEPEN PRO 10FT

## (undated) DEVICE — CHLORAPREP APPLICATOR TINTED 10.5ML ONE-STEP

## (undated) DEVICE — BASIC DOUBLE BASIN 2-LF: Brand: MEDLINE INDUSTRIES, INC.

## (undated) DEVICE — SPONGE: SPECIALTY K-DISS XR  100/CS: Brand: MEDICAL ACTION INDUSTRIES

## (undated) DEVICE — SUT PROLENE 2-0 RB-1/RB-1 36 IN 8559H

## (undated) DEVICE — DRAPE UTILITY

## (undated) DEVICE — SYRINGE 3ML LL

## (undated) DEVICE — Device: Brand: PORTEX

## (undated) DEVICE — FABRIC REINFORCED, SURGICAL GOWN, XL: Brand: CONVERTORS

## (undated) DEVICE — TIBURON TRANSVERSE LAPAROTOMY SHEET: Brand: CONVERTORS

## (undated) DEVICE — PLASTIC ADHESIVE BANDAGE: Brand: CURITY

## (undated) DEVICE — CHLORAPREP HI-LITE 26ML ORANGE

## (undated) DEVICE — SUT VICRYL 2-0 SH 27 IN UNDYED J417H

## (undated) DEVICE — NEEDLE SPINAL 22G X 3.5 IN PLST HUB

## (undated) DEVICE — RADIOLOGY STERILE LABELS: Brand: CENTURION

## (undated) DEVICE — GLOVE INDICATOR PI UNDERGLOVE SZ 6.5 BLUE

## (undated) DEVICE — STRL PENROSE DRAIN 18" X 1/2": Brand: CARDINAL HEALTH

## (undated) DEVICE — NEEDLE 25G X 1 1/2

## (undated) DEVICE — 3M™ TEGADERM™ TRANSPARENT FILM DRESSING FRAME STYLE, 1626W, 4 IN X 4-3/4 IN (10 CM X 12 CM), 50/CT 4CT/CASE: Brand: 3M™ TEGADERM™

## (undated) DEVICE — GLOVE SRG LF STRL BGL SKNSNS 7.5 PF

## (undated) DEVICE — SYRINGE 10ML LL CONTROL TOP

## (undated) DEVICE — ADHESIVE SKIN CLSR DERMABOND NX

## (undated) DEVICE — PACK GENERAL LF

## (undated) DEVICE — ADHESIVE SKIN HIGH VISCOSITY EXOFIN 1ML